# Patient Record
Sex: FEMALE | Race: ASIAN | NOT HISPANIC OR LATINO | Employment: FULL TIME | URBAN - METROPOLITAN AREA
[De-identification: names, ages, dates, MRNs, and addresses within clinical notes are randomized per-mention and may not be internally consistent; named-entity substitution may affect disease eponyms.]

---

## 2018-06-07 ENCOUNTER — OFFICE VISIT (OUTPATIENT)
Dept: INTERNAL MEDICINE | Facility: CLINIC | Age: 53
End: 2018-06-07
Payer: COMMERCIAL

## 2018-06-07 VITALS
BODY MASS INDEX: 16.23 KG/M2 | OXYGEN SATURATION: 100 % | DIASTOLIC BLOOD PRESSURE: 100 MMHG | SYSTOLIC BLOOD PRESSURE: 160 MMHG | TEMPERATURE: 98 F | WEIGHT: 115.94 LBS | HEART RATE: 95 BPM | HEIGHT: 71 IN

## 2018-06-07 DIAGNOSIS — Z82.3 FAMILY HISTORY OF CVA: ICD-10-CM

## 2018-06-07 DIAGNOSIS — R03.0 ELEVATED BP WITHOUT DIAGNOSIS OF HYPERTENSION: ICD-10-CM

## 2018-06-07 DIAGNOSIS — Z82.49 FAMILY HISTORY OF ESSENTIAL HYPERTENSION: ICD-10-CM

## 2018-06-07 DIAGNOSIS — Z76.89 ENCOUNTER TO ESTABLISH CARE WITH NEW DOCTOR: Primary | ICD-10-CM

## 2018-06-07 LAB
ALBUMIN SERPL BCP-MCNC: 4.1 G/DL
ALP SERPL-CCNC: 94 U/L
ALT SERPL W/O P-5'-P-CCNC: 9 U/L
ANION GAP SERPL CALC-SCNC: 10 MMOL/L
AST SERPL-CCNC: 18 U/L
BACTERIA #/AREA URNS AUTO: ABNORMAL /HPF
BASOPHILS # BLD AUTO: 0.05 K/UL
BASOPHILS NFR BLD: 0.5 %
BILIRUB SERPL-MCNC: 0.7 MG/DL
BILIRUB UR QL STRIP: NEGATIVE
BUN SERPL-MCNC: 9 MG/DL
CALCIUM SERPL-MCNC: 9.9 MG/DL
CHLORIDE SERPL-SCNC: 105 MMOL/L
CHOLEST SERPL-MCNC: 317 MG/DL
CHOLEST/HDLC SERPL: 4.8 {RATIO}
CLARITY UR REFRACT.AUTO: ABNORMAL
CO2 SERPL-SCNC: 24 MMOL/L
COLOR UR AUTO: YELLOW
CREAT SERPL-MCNC: 0.8 MG/DL
DIFFERENTIAL METHOD: ABNORMAL
EOSINOPHIL # BLD AUTO: 0.1 K/UL
EOSINOPHIL NFR BLD: 1.2 %
ERYTHROCYTE [DISTWIDTH] IN BLOOD BY AUTOMATED COUNT: 13 %
EST. GFR  (AFRICAN AMERICAN): >60 ML/MIN/1.73 M^2
EST. GFR  (NON AFRICAN AMERICAN): >60 ML/MIN/1.73 M^2
GLUCOSE SERPL-MCNC: 101 MG/DL
GLUCOSE UR QL STRIP: NEGATIVE
HCT VFR BLD AUTO: 44.6 %
HDLC SERPL-MCNC: 66 MG/DL
HDLC SERPL: 20.8 %
HGB BLD-MCNC: 14.9 G/DL
HGB UR QL STRIP: NEGATIVE
HYALINE CASTS UR QL AUTO: 1 /LPF
IMM GRANULOCYTES # BLD AUTO: 0.03 K/UL
IMM GRANULOCYTES NFR BLD AUTO: 0.3 %
KETONES UR QL STRIP: NEGATIVE
LDLC SERPL CALC-MCNC: 228.6 MG/DL
LEUKOCYTE ESTERASE UR QL STRIP: NEGATIVE
LYMPHOCYTES # BLD AUTO: 1.4 K/UL
LYMPHOCYTES NFR BLD: 15.2 %
MCH RBC QN AUTO: 29.2 PG
MCHC RBC AUTO-ENTMCNC: 33.4 G/DL
MCV RBC AUTO: 88 FL
MICROSCOPIC COMMENT: ABNORMAL
MONOCYTES # BLD AUTO: 0.6 K/UL
MONOCYTES NFR BLD: 6.3 %
NEUTROPHILS # BLD AUTO: 7.1 K/UL
NEUTROPHILS NFR BLD: 76.5 %
NITRITE UR QL STRIP: NEGATIVE
NONHDLC SERPL-MCNC: 251 MG/DL
NRBC BLD-RTO: 0 /100 WBC
PH UR STRIP: 7 [PH] (ref 5–8)
PLATELET # BLD AUTO: 302 K/UL
PMV BLD AUTO: 10.6 FL
POTASSIUM SERPL-SCNC: 4 MMOL/L
PROT SERPL-MCNC: 8.5 G/DL
PROT UR QL STRIP: ABNORMAL
RBC # BLD AUTO: 5.1 M/UL
RBC #/AREA URNS AUTO: 1 /HPF (ref 0–4)
SODIUM SERPL-SCNC: 139 MMOL/L
SP GR UR STRIP: 1.01 (ref 1–1.03)
SQUAMOUS #/AREA URNS AUTO: 1 /HPF
TRIGL SERPL-MCNC: 112 MG/DL
TSH SERPL DL<=0.005 MIU/L-ACNC: 1.81 UIU/ML
URN SPEC COLLECT METH UR: ABNORMAL
UROBILINOGEN UR STRIP-ACNC: NEGATIVE EU/DL
WBC # BLD AUTO: 9.27 K/UL
WBC #/AREA URNS AUTO: 0 /HPF (ref 0–5)

## 2018-06-07 PROCEDURE — 99999 PR PBB SHADOW E&M-NEW PATIENT-LVL IV: CPT | Mod: PBBFAC,,, | Performed by: FAMILY MEDICINE

## 2018-06-07 PROCEDURE — 99203 OFFICE O/P NEW LOW 30 MIN: CPT | Mod: S$GLB,,, | Performed by: FAMILY MEDICINE

## 2018-06-07 PROCEDURE — 3008F BODY MASS INDEX DOCD: CPT | Mod: CPTII,S$GLB,, | Performed by: FAMILY MEDICINE

## 2018-06-07 PROCEDURE — 93005 ELECTROCARDIOGRAM TRACING: CPT | Mod: S$GLB,,, | Performed by: FAMILY MEDICINE

## 2018-06-07 PROCEDURE — 81001 URINALYSIS AUTO W/SCOPE: CPT

## 2018-06-07 PROCEDURE — 85025 COMPLETE CBC W/AUTO DIFF WBC: CPT

## 2018-06-07 PROCEDURE — 84443 ASSAY THYROID STIM HORMONE: CPT

## 2018-06-07 PROCEDURE — 80053 COMPREHEN METABOLIC PANEL: CPT

## 2018-06-07 PROCEDURE — 80061 LIPID PANEL: CPT

## 2018-06-07 PROCEDURE — 93010 ELECTROCARDIOGRAM REPORT: CPT | Mod: S$GLB,,, | Performed by: INTERNAL MEDICINE

## 2018-06-07 NOTE — PROGRESS NOTES
Subjective:       Patient ID: Juan C Dennis is a 53 y.o. female.    Chief Complaint: Annual Exam and Establish Care    New patient establish care.  She is on no prescription medications.  Family history includes father with hypertension and CVA) hypertension.  She had preeclampsia 2002 with decreased vision left eye.  Decreased vision lasted several months and resolved.  She has paresthesias of 1/9/2016.  Emergency room evaluation was done at that time.  She had a CT scan lab tests that were all reported as negative.  She is concerned about elevated blood pressure.  She denies any chest pain palpitations shortness of breath or edema.  She is not up-to-date on GYN exam mammogram and wants to defer this.      Review of Systems   Constitutional: Negative for activity change, appetite change, fatigue and unexpected weight change.   HENT: Negative for congestion, dental problem, ear pain, hearing loss, sneezing, sore throat, tinnitus and trouble swallowing.    Eyes: Negative for pain, redness, itching and visual disturbance.   Respiratory: Negative for cough, chest tightness, shortness of breath and wheezing.    Cardiovascular: Negative for chest pain, palpitations and leg swelling.   Gastrointestinal: Negative for abdominal distention, abdominal pain, blood in stool, constipation, diarrhea, nausea and vomiting.   Genitourinary: Negative for difficulty urinating, dysuria, frequency, hematuria, urgency and vaginal bleeding.   Musculoskeletal: Negative for arthralgias, back pain, joint swelling, neck pain and neck stiffness.   Skin: Negative for rash and wound.   Neurological: Negative for dizziness, tremors, syncope, weakness, light-headedness, numbness and headaches.   Hematological: Negative for adenopathy. Does not bruise/bleed easily.   Psychiatric/Behavioral: Negative for agitation, dysphoric mood and sleep disturbance. The patient is not nervous/anxious.        Objective:      Physical Exam   Constitutional:  She is oriented to person, place, and time. She appears well-developed and well-nourished.   HENT:   Right Ear: External ear normal.   Left Ear: External ear normal.   Nose: Nose normal.   Mouth/Throat: Oropharynx is clear and moist.   Eyes: Conjunctivae and EOM are normal. Pupils are equal, round, and reactive to light.   Neck: Normal range of motion. Neck supple. No thyromegaly present.   Cardiovascular: Normal rate, regular rhythm and normal heart sounds.    No murmur heard.  Pulmonary/Chest: Effort normal and breath sounds normal. She has no wheezes. She has no rales.   Abdominal: Soft. Bowel sounds are normal. She exhibits no distension and no mass. There is no tenderness.   Musculoskeletal: She exhibits no edema or tenderness.   Lymphadenopathy:     She has no cervical adenopathy.   Neurological: She is alert and oriented to person, place, and time. She has normal reflexes. She displays normal reflexes. No cranial nerve deficit. She exhibits normal muscle tone. Coordination normal.   Skin: Skin is warm and dry. No rash noted.   Psychiatric: She has a normal mood and affect. Her behavior is normal. Judgment and thought content normal.       No results found for any previous visit.     Assessment:       1. Elevated BP without diagnosis of hypertension         blood pressure recheck 150/100.  Pulse is 95.  She reports purulent nervous today.  Monitor blood pressure home for 2 weeks and return in 2 weeks.  Consider medication on return.  EKG lab was ordered today.    Elevated BP without diagnosis of hypertension  -     CBC auto differential  -     Urinalysis  -     Comprehensive metabolic panel  -     Lipid panel  -     TSH  -     EKG 12-lead

## 2018-06-11 DIAGNOSIS — R77.9 ELEVATED SERUM PROTEIN LEVEL: Primary | ICD-10-CM

## 2018-06-21 ENCOUNTER — OFFICE VISIT (OUTPATIENT)
Dept: INTERNAL MEDICINE | Facility: CLINIC | Age: 53
End: 2018-06-21
Payer: COMMERCIAL

## 2018-06-21 VITALS
HEIGHT: 59 IN | HEART RATE: 87 BPM | DIASTOLIC BLOOD PRESSURE: 84 MMHG | BODY MASS INDEX: 23.11 KG/M2 | WEIGHT: 114.63 LBS | SYSTOLIC BLOOD PRESSURE: 120 MMHG | OXYGEN SATURATION: 98 % | TEMPERATURE: 97 F

## 2018-06-21 DIAGNOSIS — E78.5 HYPERLIPIDEMIA, UNSPECIFIED HYPERLIPIDEMIA TYPE: ICD-10-CM

## 2018-06-21 DIAGNOSIS — R03.0 ELEVATED BP WITHOUT DIAGNOSIS OF HYPERTENSION: Primary | ICD-10-CM

## 2018-06-21 PROCEDURE — 99213 OFFICE O/P EST LOW 20 MIN: CPT | Mod: S$GLB,,, | Performed by: FAMILY MEDICINE

## 2018-06-21 PROCEDURE — 3008F BODY MASS INDEX DOCD: CPT | Mod: CPTII,S$GLB,, | Performed by: FAMILY MEDICINE

## 2018-06-21 PROCEDURE — 99999 PR PBB SHADOW E&M-EST. PATIENT-LVL III: CPT | Mod: PBBFAC,,, | Performed by: FAMILY MEDICINE

## 2018-06-21 NOTE — PROGRESS NOTES
Subjective:       Patient ID: Juan C Dennis is a 53 y.o. female.    Chief Complaint: Follow-up    Follow-up elevated blood pressure review lab.  Home blood pressure readings 126/82, 129/70, 132/78, 149/75.  She denies headache chest pain palpitations shortness of breath or edema      Review of Systems   Constitutional: Negative for appetite change, fatigue and unexpected weight change.   Respiratory: Negative for cough, shortness of breath and wheezing.    Cardiovascular: Negative for chest pain, palpitations and leg swelling.   Gastrointestinal: Negative for abdominal pain.   Genitourinary: Negative for difficulty urinating.   Neurological: Negative for headaches.       Objective:      Physical Exam   Constitutional: She is oriented to person, place, and time. She appears well-developed and well-nourished. No distress.   Neck: Neck supple. No thyromegaly present.   Cardiovascular: Normal rate, regular rhythm and normal heart sounds.    No murmur heard.  Pulmonary/Chest: Effort normal and breath sounds normal. No respiratory distress. She has no wheezes.   Abdominal: Soft. Bowel sounds are normal. She exhibits no mass. There is no tenderness.   Lymphadenopathy:     She has no cervical adenopathy.   Neurological: She is alert and oriented to person, place, and time.       Office Visit on 06/07/2018   Component Date Value Ref Range Status    WBC 06/07/2018 9.27  3.90 - 12.70 K/uL Final    RBC 06/07/2018 5.10  4.00 - 5.40 M/uL Final    Hemoglobin 06/07/2018 14.9  12.0 - 16.0 g/dL Final    Hematocrit 06/07/2018 44.6  37.0 - 48.5 % Final    MCV 06/07/2018 88  82 - 98 fL Final    MCH 06/07/2018 29.2  27.0 - 31.0 pg Final    MCHC 06/07/2018 33.4  32.0 - 36.0 g/dL Final    RDW 06/07/2018 13.0  11.5 - 14.5 % Final    Platelets 06/07/2018 302  150 - 350 K/uL Final    MPV 06/07/2018 10.6  9.2 - 12.9 fL Final    Immature Granulocytes 06/07/2018 0.3  0.0 - 0.5 % Final    Gran # (ANC) 06/07/2018 7.1  1.8 - 7.7  K/uL Final    Immature Grans (Abs) 06/07/2018 0.03  0.00 - 0.04 K/uL Final    Lymph # 06/07/2018 1.4  1.0 - 4.8 K/uL Final    Mono # 06/07/2018 0.6  0.3 - 1.0 K/uL Final    Eos # 06/07/2018 0.1  0.0 - 0.5 K/uL Final    Baso # 06/07/2018 0.05  0.00 - 0.20 K/uL Final    nRBC 06/07/2018 0  0 /100 WBC Final    Gran% 06/07/2018 76.5* 38.0 - 73.0 % Final    Lymph% 06/07/2018 15.2* 18.0 - 48.0 % Final    Mono% 06/07/2018 6.3  4.0 - 15.0 % Final    Eosinophil% 06/07/2018 1.2  0.0 - 8.0 % Final    Basophil% 06/07/2018 0.5  0.0 - 1.9 % Final    Differential Method 06/07/2018 Automated   Final    Specimen UA 06/07/2018 Urine, Clean Catch   Final    Color, UA 06/07/2018 Yellow  Yellow, Straw, Flory Final    Appearance, UA 06/07/2018 Hazy* Clear Final    pH, UA 06/07/2018 7.0  5.0 - 8.0 Final    Specific Gravity, UA 06/07/2018 1.010  1.005 - 1.030 Final    Protein, UA 06/07/2018 2+* Negative Final    Glucose, UA 06/07/2018 Negative  Negative Final    Ketones, UA 06/07/2018 Negative  Negative Final    Bilirubin (UA) 06/07/2018 Negative  Negative Final    Occult Blood UA 06/07/2018 Negative  Negative Final    Nitrite, UA 06/07/2018 Negative  Negative Final    Urobilinogen, UA 06/07/2018 Negative  <2.0 EU/dL Final    Leukocytes, UA 06/07/2018 Negative  Negative Final    Sodium 06/07/2018 139  136 - 145 mmol/L Final    Potassium 06/07/2018 4.0  3.5 - 5.1 mmol/L Final    Chloride 06/07/2018 105  95 - 110 mmol/L Final    CO2 06/07/2018 24  23 - 29 mmol/L Final    Glucose 06/07/2018 101  70 - 110 mg/dL Final    BUN, Bld 06/07/2018 9  6 - 20 mg/dL Final    Creatinine 06/07/2018 0.8  0.5 - 1.4 mg/dL Final    Calcium 06/07/2018 9.9  8.7 - 10.5 mg/dL Final    Total Protein 06/07/2018 8.5* 6.0 - 8.4 g/dL Final    Albumin 06/07/2018 4.1  3.5 - 5.2 g/dL Final    Total Bilirubin 06/07/2018 0.7  0.1 - 1.0 mg/dL Final    Alkaline Phosphatase 06/07/2018 94  55 - 135 U/L Final    AST 06/07/2018 18  10 - 40 U/L  Final    ALT 06/07/2018 9* 10 - 44 U/L Final    Anion Gap 06/07/2018 10  8 - 16 mmol/L Final    eGFR if African American 06/07/2018 >60.0  >60 mL/min/1.73 m^2 Final    eGFR if non African American 06/07/2018 >60.0  >60 mL/min/1.73 m^2 Final    Cholesterol 06/07/2018 317* 120 - 199 mg/dL Final    Triglycerides 06/07/2018 112  30 - 150 mg/dL Final    HDL 06/07/2018 66  40 - 75 mg/dL Final    LDL Cholesterol 06/07/2018 228.6* 63.0 - 159.0 mg/dL Final    HDL/Chol Ratio 06/07/2018 20.8  20.0 - 50.0 % Final    Total Cholesterol/HDL Ratio 06/07/2018 4.8  2.0 - 5.0 Final    Non-HDL Cholesterol 06/07/2018 251  mg/dL Final    TSH 06/07/2018 1.808  0.400 - 4.000 uIU/mL Final    RBC, UA 06/07/2018 1  0 - 4 /hpf Final    WBC, UA 06/07/2018 0  0 - 5 /hpf Final    Bacteria, UA 06/07/2018 Few* None-Occ /hpf Final    Squam Epithel, UA 06/07/2018 1  /hpf Final    Hyaline Casts, UA 06/07/2018 1  0-1/lpf /lpf Final    Microscopic Comment 06/07/2018 SEE COMMENT   Final     Assessment:       No diagnosis found.    Plan:     Blood pressure 120/84.  Continue home monitor.  Lab was reviewed.  Low-cholesterol diet discussed.  Return in one month office visit to recheck lipid profile as well as protein electrophoresis.  We will also recheck urine protein.    There are no diagnoses linked to this encounter.

## 2018-07-09 ENCOUNTER — PATIENT OUTREACH (OUTPATIENT)
Dept: ADMINISTRATIVE | Facility: HOSPITAL | Age: 53
End: 2018-07-09

## 2018-07-23 ENCOUNTER — OFFICE VISIT (OUTPATIENT)
Dept: INTERNAL MEDICINE | Facility: CLINIC | Age: 53
End: 2018-07-23
Payer: COMMERCIAL

## 2018-07-23 VITALS
WEIGHT: 113.75 LBS | HEIGHT: 59 IN | DIASTOLIC BLOOD PRESSURE: 80 MMHG | BODY MASS INDEX: 22.93 KG/M2 | HEART RATE: 89 BPM | SYSTOLIC BLOOD PRESSURE: 137 MMHG | TEMPERATURE: 98 F | OXYGEN SATURATION: 98 %

## 2018-07-23 DIAGNOSIS — R80.9 PROTEINURIA, UNSPECIFIED TYPE: ICD-10-CM

## 2018-07-23 DIAGNOSIS — E78.5 HYPERLIPIDEMIA, UNSPECIFIED HYPERLIPIDEMIA TYPE: Primary | ICD-10-CM

## 2018-07-23 DIAGNOSIS — R77.9 ELEVATED SERUM PROTEIN LEVEL: ICD-10-CM

## 2018-07-23 LAB
BILIRUB UR QL STRIP: NEGATIVE
CLARITY UR REFRACT.AUTO: ABNORMAL
COLOR UR AUTO: YELLOW
GLUCOSE UR QL STRIP: NEGATIVE
HGB UR QL STRIP: NEGATIVE
HYALINE CASTS UR QL AUTO: 10 /LPF
KETONES UR QL STRIP: NEGATIVE
LEUKOCYTE ESTERASE UR QL STRIP: NEGATIVE
MICROSCOPIC COMMENT: ABNORMAL
NITRITE UR QL STRIP: NEGATIVE
PH UR STRIP: 7 [PH] (ref 5–8)
PROT UR QL STRIP: NEGATIVE
SP GR UR STRIP: 1.01 (ref 1–1.03)
SQUAMOUS #/AREA URNS AUTO: 1 /HPF
URN SPEC COLLECT METH UR: ABNORMAL
UROBILINOGEN UR STRIP-ACNC: NEGATIVE EU/DL
WBC #/AREA URNS AUTO: 0 /HPF (ref 0–5)

## 2018-07-23 PROCEDURE — 84155 ASSAY OF PROTEIN SERUM: CPT

## 2018-07-23 PROCEDURE — 99999 PR PBB SHADOW E&M-EST. PATIENT-LVL III: CPT | Mod: PBBFAC,,, | Performed by: FAMILY MEDICINE

## 2018-07-23 PROCEDURE — 3008F BODY MASS INDEX DOCD: CPT | Mod: CPTII,S$GLB,, | Performed by: FAMILY MEDICINE

## 2018-07-23 PROCEDURE — 80061 LIPID PANEL: CPT

## 2018-07-23 PROCEDURE — 99213 OFFICE O/P EST LOW 20 MIN: CPT | Mod: S$GLB,,, | Performed by: FAMILY MEDICINE

## 2018-07-23 PROCEDURE — 81001 URINALYSIS AUTO W/SCOPE: CPT

## 2018-07-23 NOTE — PROGRESS NOTES
Subjective:       Patient ID: Juan C Dennis is a 53 y.o. female.    Chief Complaint: Follow-up    Follow-up elevated blood pressure hyperlipidemia proteinuria.  She reports home blood pressure monitor has been normal.  She is started low cholesterol diet  reports 1 lb weight loss.  Health maintenance was reviewed.  She wants to for gyn exam mammogram colonoscopy.      Review of Systems   Constitutional: Negative for appetite change, fatigue and unexpected weight change.   Respiratory: Negative for cough, shortness of breath and wheezing.    Cardiovascular: Negative for chest pain, palpitations and leg swelling.   Gastrointestinal: Negative for abdominal pain.   Genitourinary: Negative for difficulty urinating.   Musculoskeletal: Negative for myalgias.   Neurological: Negative for headaches.       Objective:      Physical Exam   Constitutional: She is oriented to person, place, and time. She appears well-developed and well-nourished. No distress.   Neck: Neck supple. No thyromegaly present.   Cardiovascular: Normal rate, regular rhythm and normal heart sounds.    No murmur heard.  Pulmonary/Chest: Effort normal and breath sounds normal. No respiratory distress. She has no wheezes.   Lymphadenopathy:     She has no cervical adenopathy.   Neurological: She is alert and oriented to person, place, and time.       Office Visit on 06/07/2018   Component Date Value Ref Range Status    WBC 06/07/2018 9.27  3.90 - 12.70 K/uL Final    RBC 06/07/2018 5.10  4.00 - 5.40 M/uL Final    Hemoglobin 06/07/2018 14.9  12.0 - 16.0 g/dL Final    Hematocrit 06/07/2018 44.6  37.0 - 48.5 % Final    MCV 06/07/2018 88  82 - 98 fL Final    MCH 06/07/2018 29.2  27.0 - 31.0 pg Final    MCHC 06/07/2018 33.4  32.0 - 36.0 g/dL Final    RDW 06/07/2018 13.0  11.5 - 14.5 % Final    Platelets 06/07/2018 302  150 - 350 K/uL Final    MPV 06/07/2018 10.6  9.2 - 12.9 fL Final    Immature Granulocytes 06/07/2018 0.3  0.0 - 0.5 % Final     Gran # (ANC) 06/07/2018 7.1  1.8 - 7.7 K/uL Final    Immature Grans (Abs) 06/07/2018 0.03  0.00 - 0.04 K/uL Final    Lymph # 06/07/2018 1.4  1.0 - 4.8 K/uL Final    Mono # 06/07/2018 0.6  0.3 - 1.0 K/uL Final    Eos # 06/07/2018 0.1  0.0 - 0.5 K/uL Final    Baso # 06/07/2018 0.05  0.00 - 0.20 K/uL Final    nRBC 06/07/2018 0  0 /100 WBC Final    Gran% 06/07/2018 76.5* 38.0 - 73.0 % Final    Lymph% 06/07/2018 15.2* 18.0 - 48.0 % Final    Mono% 06/07/2018 6.3  4.0 - 15.0 % Final    Eosinophil% 06/07/2018 1.2  0.0 - 8.0 % Final    Basophil% 06/07/2018 0.5  0.0 - 1.9 % Final    Differential Method 06/07/2018 Automated   Final    Specimen UA 06/07/2018 Urine, Clean Catch   Final    Color, UA 06/07/2018 Yellow  Yellow, Straw, Flory Final    Appearance, UA 06/07/2018 Hazy* Clear Final    pH, UA 06/07/2018 7.0  5.0 - 8.0 Final    Specific Gravity, UA 06/07/2018 1.010  1.005 - 1.030 Final    Protein, UA 06/07/2018 2+* Negative Final    Glucose, UA 06/07/2018 Negative  Negative Final    Ketones, UA 06/07/2018 Negative  Negative Final    Bilirubin (UA) 06/07/2018 Negative  Negative Final    Occult Blood UA 06/07/2018 Negative  Negative Final    Nitrite, UA 06/07/2018 Negative  Negative Final    Urobilinogen, UA 06/07/2018 Negative  <2.0 EU/dL Final    Leukocytes, UA 06/07/2018 Negative  Negative Final    Sodium 06/07/2018 139  136 - 145 mmol/L Final    Potassium 06/07/2018 4.0  3.5 - 5.1 mmol/L Final    Chloride 06/07/2018 105  95 - 110 mmol/L Final    CO2 06/07/2018 24  23 - 29 mmol/L Final    Glucose 06/07/2018 101  70 - 110 mg/dL Final    BUN, Bld 06/07/2018 9  6 - 20 mg/dL Final    Creatinine 06/07/2018 0.8  0.5 - 1.4 mg/dL Final    Calcium 06/07/2018 9.9  8.7 - 10.5 mg/dL Final    Total Protein 06/07/2018 8.5* 6.0 - 8.4 g/dL Final    Albumin 06/07/2018 4.1  3.5 - 5.2 g/dL Final    Total Bilirubin 06/07/2018 0.7  0.1 - 1.0 mg/dL Final    Alkaline Phosphatase 06/07/2018 94  55 - 135 U/L  Final    AST 06/07/2018 18  10 - 40 U/L Final    ALT 06/07/2018 9* 10 - 44 U/L Final    Anion Gap 06/07/2018 10  8 - 16 mmol/L Final    eGFR if African American 06/07/2018 >60.0  >60 mL/min/1.73 m^2 Final    eGFR if non African American 06/07/2018 >60.0  >60 mL/min/1.73 m^2 Final    Cholesterol 06/07/2018 317* 120 - 199 mg/dL Final    Triglycerides 06/07/2018 112  30 - 150 mg/dL Final    HDL 06/07/2018 66  40 - 75 mg/dL Final    LDL Cholesterol 06/07/2018 228.6* 63.0 - 159.0 mg/dL Final    HDL/Chol Ratio 06/07/2018 20.8  20.0 - 50.0 % Final    Total Cholesterol/HDL Ratio 06/07/2018 4.8  2.0 - 5.0 Final    Non-HDL Cholesterol 06/07/2018 251  mg/dL Final    TSH 06/07/2018 1.808  0.400 - 4.000 uIU/mL Final    RBC, UA 06/07/2018 1  0 - 4 /hpf Final    WBC, UA 06/07/2018 0  0 - 5 /hpf Final    Bacteria, UA 06/07/2018 Few* None-Occ /hpf Final    Squam Epithel, UA 06/07/2018 1  /hpf Final    Hyaline Casts, UA 06/07/2018 1  0-1/lpf /lpf Final    Microscopic Comment 06/07/2018 SEE COMMENT   Final     Assessment:       1. Hyperlipidemia, unspecified hyperlipidemia type    2. Elevated serum protein level    3. Proteinuria, unspecified type        Plan:   Blood pressure is normal.  Continue to monitor.  Lab was ordered.      Hyperlipidemia, unspecified hyperlipidemia type  -     Lipid panel    Elevated serum protein level  -     Protein, total; Future; Expected date: 07/23/2018    Proteinuria, unspecified type  -     Urinalysis    Other orders  -     Cancel: Case request GI: COLONOSCOPY  -     Cancel: Mammo Digital Screening Bilateral With CAD; Future; Expected date: 07/09/2018

## 2018-07-24 LAB
CHOLEST SERPL-MCNC: 292 MG/DL
CHOLEST/HDLC SERPL: 4.6 {RATIO}
HDLC SERPL-MCNC: 64 MG/DL
HDLC SERPL: 21.9 %
LDLC SERPL CALC-MCNC: 206 MG/DL
NONHDLC SERPL-MCNC: 228 MG/DL
PROT SERPL-MCNC: 7.9 G/DL
TRIGL SERPL-MCNC: 110 MG/DL

## 2018-07-25 ENCOUNTER — TELEPHONE (OUTPATIENT)
Dept: INTERNAL MEDICINE | Facility: CLINIC | Age: 53
End: 2018-07-25

## 2018-10-03 ENCOUNTER — PATIENT OUTREACH (OUTPATIENT)
Dept: ADMINISTRATIVE | Facility: HOSPITAL | Age: 53
End: 2018-10-03

## 2019-01-29 ENCOUNTER — PATIENT OUTREACH (OUTPATIENT)
Dept: ADMINISTRATIVE | Facility: HOSPITAL | Age: 54
End: 2019-01-29

## 2019-05-07 DIAGNOSIS — Z12.11 COLON CANCER SCREENING: ICD-10-CM

## 2019-08-16 ENCOUNTER — PATIENT OUTREACH (OUTPATIENT)
Dept: ADMINISTRATIVE | Facility: HOSPITAL | Age: 54
End: 2019-08-16

## 2019-10-25 ENCOUNTER — PATIENT OUTREACH (OUTPATIENT)
Dept: ADMINISTRATIVE | Facility: HOSPITAL | Age: 54
End: 2019-10-25

## 2019-11-26 ENCOUNTER — OFFICE VISIT (OUTPATIENT)
Dept: INTERNAL MEDICINE | Facility: CLINIC | Age: 54
End: 2019-11-26
Payer: COMMERCIAL

## 2019-11-26 VITALS
SYSTOLIC BLOOD PRESSURE: 120 MMHG | OXYGEN SATURATION: 97 % | BODY MASS INDEX: 24.36 KG/M2 | DIASTOLIC BLOOD PRESSURE: 68 MMHG | TEMPERATURE: 97 F | HEIGHT: 59 IN | WEIGHT: 120.81 LBS | HEART RATE: 107 BPM

## 2019-11-26 DIAGNOSIS — Z00.00 ANNUAL PHYSICAL EXAM: Primary | ICD-10-CM

## 2019-11-26 DIAGNOSIS — Z28.39 IMMUNIZATION DEFICIENCY: ICD-10-CM

## 2019-11-26 DIAGNOSIS — Z82.49 FAMILY HISTORY OF ESSENTIAL HYPERTENSION: ICD-10-CM

## 2019-11-26 DIAGNOSIS — Z82.3 FAMILY HISTORY OF CVA: ICD-10-CM

## 2019-11-26 DIAGNOSIS — Z12.11 COLON CANCER SCREENING: ICD-10-CM

## 2019-11-26 DIAGNOSIS — E78.5 HYPERLIPIDEMIA, UNSPECIFIED HYPERLIPIDEMIA TYPE: ICD-10-CM

## 2019-11-26 PROBLEM — R03.0 ELEVATED BP WITHOUT DIAGNOSIS OF HYPERTENSION: Status: RESOLVED | Noted: 2018-06-07 | Resolved: 2019-11-26

## 2019-11-26 LAB
BASOPHILS # BLD AUTO: 0.07 K/UL (ref 0–0.2)
BASOPHILS NFR BLD: 1.1 % (ref 0–1.9)
BILIRUB UR QL STRIP: NEGATIVE
CLARITY UR REFRACT.AUTO: CLEAR
COLOR UR AUTO: NORMAL
DIFFERENTIAL METHOD: ABNORMAL
EOSINOPHIL # BLD AUTO: 0.2 K/UL (ref 0–0.5)
EOSINOPHIL NFR BLD: 3.6 % (ref 0–8)
ERYTHROCYTE [DISTWIDTH] IN BLOOD BY AUTOMATED COUNT: 12.8 % (ref 11.5–14.5)
GLUCOSE UR QL STRIP: NEGATIVE
HCT VFR BLD AUTO: 46.6 % (ref 37–48.5)
HGB BLD-MCNC: 14.7 G/DL (ref 12–16)
HGB UR QL STRIP: NEGATIVE
IMM GRANULOCYTES # BLD AUTO: 0.01 K/UL (ref 0–0.04)
IMM GRANULOCYTES NFR BLD AUTO: 0.2 % (ref 0–0.5)
KETONES UR QL STRIP: NEGATIVE
LEUKOCYTE ESTERASE UR QL STRIP: NEGATIVE
LYMPHOCYTES # BLD AUTO: 1.8 K/UL (ref 1–4.8)
LYMPHOCYTES NFR BLD: 28.4 % (ref 18–48)
MCH RBC QN AUTO: 27.9 PG (ref 27–31)
MCHC RBC AUTO-ENTMCNC: 31.5 G/DL (ref 32–36)
MCV RBC AUTO: 88 FL (ref 82–98)
MONOCYTES # BLD AUTO: 0.6 K/UL (ref 0.3–1)
MONOCYTES NFR BLD: 8.5 % (ref 4–15)
NEUTROPHILS # BLD AUTO: 3.8 K/UL (ref 1.8–7.7)
NEUTROPHILS NFR BLD: 58.2 % (ref 38–73)
NITRITE UR QL STRIP: NEGATIVE
NRBC BLD-RTO: 0 /100 WBC
PH UR STRIP: 8 [PH] (ref 5–8)
PLATELET # BLD AUTO: 294 K/UL (ref 150–350)
PMV BLD AUTO: 10.7 FL (ref 9.2–12.9)
PROT UR QL STRIP: NEGATIVE
RBC # BLD AUTO: 5.27 M/UL (ref 4–5.4)
SP GR UR STRIP: 1.01 (ref 1–1.03)
URN SPEC COLLECT METH UR: NORMAL
WBC # BLD AUTO: 6.47 K/UL (ref 3.9–12.7)

## 2019-11-26 PROCEDURE — 99999 PR PBB SHADOW E&M-EST. PATIENT-LVL III: ICD-10-PCS | Mod: PBBFAC,,, | Performed by: FAMILY MEDICINE

## 2019-11-26 PROCEDURE — 81003 URINALYSIS AUTO W/O SCOPE: CPT

## 2019-11-26 PROCEDURE — 99214 OFFICE O/P EST MOD 30 MIN: CPT | Mod: 25,S$GLB,, | Performed by: FAMILY MEDICINE

## 2019-11-26 PROCEDURE — 99999 PR PBB SHADOW E&M-EST. PATIENT-LVL III: CPT | Mod: PBBFAC,,, | Performed by: FAMILY MEDICINE

## 2019-11-26 PROCEDURE — 99214 PR OFFICE/OUTPT VISIT, EST, LEVL IV, 30-39 MIN: ICD-10-PCS | Mod: 25,S$GLB,, | Performed by: FAMILY MEDICINE

## 2019-11-26 PROCEDURE — 84443 ASSAY THYROID STIM HORMONE: CPT

## 2019-11-26 PROCEDURE — 80053 COMPREHEN METABOLIC PANEL: CPT

## 2019-11-26 PROCEDURE — 90715 TDAP VACCINE 7 YRS/> IM: CPT | Mod: S$GLB,,, | Performed by: FAMILY MEDICINE

## 2019-11-26 PROCEDURE — 3008F BODY MASS INDEX DOCD: CPT | Mod: CPTII,S$GLB,, | Performed by: FAMILY MEDICINE

## 2019-11-26 PROCEDURE — 3008F PR BODY MASS INDEX (BMI) DOCUMENTED: ICD-10-PCS | Mod: CPTII,S$GLB,, | Performed by: FAMILY MEDICINE

## 2019-11-26 PROCEDURE — 90471 IMMUNIZATION ADMIN: CPT | Mod: S$GLB,,, | Performed by: FAMILY MEDICINE

## 2019-11-26 PROCEDURE — 90471 TDAP VACCINE GREATER THAN OR EQUAL TO 7YO IM: ICD-10-PCS | Mod: S$GLB,,, | Performed by: FAMILY MEDICINE

## 2019-11-26 PROCEDURE — 80061 LIPID PANEL: CPT

## 2019-11-26 PROCEDURE — 90715 TDAP VACCINE GREATER THAN OR EQUAL TO 7YO IM: ICD-10-PCS | Mod: S$GLB,,, | Performed by: FAMILY MEDICINE

## 2019-11-26 PROCEDURE — 85025 COMPLETE CBC W/AUTO DIFF WBC: CPT

## 2019-11-26 NOTE — PROGRESS NOTES
Subjective:       Patient ID: Juan C Dennis is a 54 y.o. female.    Chief Complaint: Annual Exam    Annual exam.  Family history of CVA hypertension.  She has a history of elevated serum protein and hyperlipidemia.  She is no prescription medications.  Social history is negative for cigarettes and alcohol.    Review of Systems   Constitutional: Negative for activity change, appetite change, fatigue and unexpected weight change.   HENT: Negative for congestion, dental problem, ear pain, hearing loss, sneezing, sore throat, tinnitus and trouble swallowing.    Eyes: Negative for pain, redness, itching and visual disturbance.   Respiratory: Negative for cough, chest tightness, shortness of breath and wheezing.    Cardiovascular: Negative for chest pain, palpitations and leg swelling.   Gastrointestinal: Negative for abdominal distention, abdominal pain, blood in stool, constipation, diarrhea, nausea and vomiting.   Genitourinary: Negative for difficulty urinating, dysuria, frequency, hematuria and urgency.        She reports that she occasionally skips menstrual period   Musculoskeletal: Negative for arthralgias, back pain, joint swelling, neck pain and neck stiffness.   Skin: Negative for rash and wound.   Neurological: Negative for dizziness, tremors, syncope, weakness, light-headedness, numbness and headaches.   Hematological: Negative for adenopathy. Does not bruise/bleed easily.   Psychiatric/Behavioral: Negative for agitation, dysphoric mood and sleep disturbance. The patient is not nervous/anxious.        Objective:      Physical Exam   Constitutional: She is oriented to person, place, and time. She appears well-developed and well-nourished. No distress.   HENT:   Right Ear: External ear normal.   Left Ear: External ear normal.   Nose: Nose normal.   Mouth/Throat: Oropharynx is clear and moist.   Eyes: Pupils are equal, round, and reactive to light. Conjunctivae and EOM are normal.   Neck: Normal range of  motion. Neck supple. No JVD present. No tracheal deviation present. No thyromegaly present.   Cardiovascular: Normal rate, regular rhythm and normal heart sounds. Exam reveals no gallop.   No murmur heard.  Pulmonary/Chest: Effort normal and breath sounds normal. No respiratory distress. She has no wheezes. She has no rales.   Abdominal: Soft. Bowel sounds are normal. She exhibits no distension and no mass. There is no tenderness.   Musculoskeletal: She exhibits no edema or tenderness.   Lymphadenopathy:     She has no cervical adenopathy.   Neurological: She is alert and oriented to person, place, and time. She has normal reflexes. She displays normal reflexes. No cranial nerve deficit. She exhibits normal muscle tone. Coordination normal.   Skin: Skin is warm and dry. No rash noted. She is not diaphoretic.   Psychiatric: She has a normal mood and affect. Her behavior is normal. Judgment and thought content normal.       Office Visit on 07/23/2018   Component Date Value Ref Range Status    Cholesterol 07/23/2018 292* 120 - 199 mg/dL Final    Triglycerides 07/23/2018 110  30 - 150 mg/dL Final    HDL 07/23/2018 64  40 - 75 mg/dL Final    LDL Cholesterol 07/23/2018 206.0* 63.0 - 159.0 mg/dL Final    Hdl/Cholesterol Ratio 07/23/2018 21.9  20.0 - 50.0 % Final    Total Cholesterol/HDL Ratio 07/23/2018 4.6  2.0 - 5.0 Final    Non-HDL Cholesterol 07/23/2018 228  mg/dL Final    Specimen UA 07/23/2018 Urine, Clean Catch   Final    Color, UA 07/23/2018 Yellow  Yellow, Straw, Flory Final    Appearance, UA 07/23/2018 Cloudy* Clear Final    pH, UA 07/23/2018 7.0  5.0 - 8.0 Final    Specific Gravity, UA 07/23/2018 1.015  1.005 - 1.030 Final    Protein, UA 07/23/2018 Negative  Negative Final    Glucose, UA 07/23/2018 Negative  Negative Final    Ketones, UA 07/23/2018 Negative  Negative Final    Bilirubin (UA) 07/23/2018 Negative  Negative Final    Occult Blood UA 07/23/2018 Negative  Negative Final    Nitrite, UA  07/23/2018 Negative  Negative Final    Urobilinogen, UA 07/23/2018 Negative  <2.0 EU/dL Final    Leukocytes, UA 07/23/2018 Negative  Negative Final    Total Protein 07/23/2018 7.9  6.0 - 8.4 g/dL Final    WBC, UA 07/23/2018 0  0 - 5 /hpf Final    Squam Epithel, UA 07/23/2018 1  /hpf Final    Hyaline Casts, UA 07/23/2018 10* 0-1/lpf /lpf Final    Microscopic Comment 07/23/2018 SEE COMMENT   Final     Assessment:       1. Annual physical exam    2. Colon cancer screening        Plan:     Health maintenance reviewed.  CBC urinalysis CMP lipids TSH was ordered.  Fecal immunoassay test was ordered.  She declined colonoscopy.  She declines mammogram and Pap smear.  She declined flu immunization.  She agrees to tetanus immunization.  She will get shingles immunization at the pharmacy.  Routine follow-up in 1 year.    Annual physical exam  -     CBC auto differential  -     Urinalysis  -     Comprehensive metabolic panel  -     Lipid panel  -     TSH    Colon cancer screening  -     Fecal Immunochemical Test (iFOBT); Future; Expected date: 11/26/2019    Other orders  -     (In Office Administered) Tdap Vaccine

## 2019-11-27 LAB
ALBUMIN SERPL BCP-MCNC: 4.3 G/DL (ref 3.5–5.2)
ALP SERPL-CCNC: 139 U/L (ref 55–135)
ALT SERPL W/O P-5'-P-CCNC: 42 U/L (ref 10–44)
ANION GAP SERPL CALC-SCNC: 9 MMOL/L (ref 8–16)
AST SERPL-CCNC: 34 U/L (ref 10–40)
BILIRUB SERPL-MCNC: 0.5 MG/DL (ref 0.1–1)
BUN SERPL-MCNC: 11 MG/DL (ref 6–20)
CALCIUM SERPL-MCNC: 9.6 MG/DL (ref 8.7–10.5)
CHLORIDE SERPL-SCNC: 106 MMOL/L (ref 95–110)
CHOLEST SERPL-MCNC: 341 MG/DL (ref 120–199)
CHOLEST/HDLC SERPL: 4.4 {RATIO} (ref 2–5)
CO2 SERPL-SCNC: 27 MMOL/L (ref 23–29)
CREAT SERPL-MCNC: 0.8 MG/DL (ref 0.5–1.4)
EST. GFR  (AFRICAN AMERICAN): >60 ML/MIN/1.73 M^2
EST. GFR  (NON AFRICAN AMERICAN): >60 ML/MIN/1.73 M^2
GLUCOSE SERPL-MCNC: 100 MG/DL (ref 70–110)
HDLC SERPL-MCNC: 78 MG/DL (ref 40–75)
HDLC SERPL: 22.9 % (ref 20–50)
LDLC SERPL CALC-MCNC: 236.2 MG/DL (ref 63–159)
NONHDLC SERPL-MCNC: 263 MG/DL
POTASSIUM SERPL-SCNC: 4 MMOL/L (ref 3.5–5.1)
PROT SERPL-MCNC: 8.5 G/DL (ref 6–8.4)
SODIUM SERPL-SCNC: 142 MMOL/L (ref 136–145)
TRIGL SERPL-MCNC: 134 MG/DL (ref 30–150)
TSH SERPL DL<=0.005 MIU/L-ACNC: 2.39 UIU/ML (ref 0.4–4)

## 2019-12-02 ENCOUNTER — TELEPHONE (OUTPATIENT)
Dept: INTERNAL MEDICINE | Facility: CLINIC | Age: 54
End: 2019-12-02

## 2019-12-02 NOTE — TELEPHONE ENCOUNTER
Protein level slightly elevated as before.  Cholesterol is high at 341 she needs to start on cholesterol lowering medication.  If she agrees I will send prescription pharmacy

## 2020-11-25 ENCOUNTER — HOSPITAL ENCOUNTER (OUTPATIENT)
Dept: CARDIOLOGY | Facility: HOSPITAL | Age: 55
Discharge: HOME | End: 2020-11-25
Payer: COMMERCIAL

## 2020-11-25 ENCOUNTER — OFFICE VISIT (OUTPATIENT)
Dept: INTERNAL MEDICINE | Facility: CLINIC | Age: 55
End: 2020-11-25
Payer: COMMERCIAL

## 2020-11-25 VITALS
HEART RATE: 92 BPM | BODY MASS INDEX: 24.18 KG/M2 | OXYGEN SATURATION: 99 % | DIASTOLIC BLOOD PRESSURE: 120 MMHG | SYSTOLIC BLOOD PRESSURE: 192 MMHG | TEMPERATURE: 98 F | WEIGHT: 119.94 LBS | HEIGHT: 59 IN

## 2020-11-25 DIAGNOSIS — Z00.00 ANNUAL PHYSICAL EXAM: Primary | ICD-10-CM

## 2020-11-25 DIAGNOSIS — R03.0 ELEVATED BLOOD PRESSURE READING: ICD-10-CM

## 2020-11-25 DIAGNOSIS — R80.9 PROTEINURIA, UNSPECIFIED TYPE: ICD-10-CM

## 2020-11-25 DIAGNOSIS — I10 ESSENTIAL HYPERTENSION: ICD-10-CM

## 2020-11-25 DIAGNOSIS — E78.5 HYPERLIPIDEMIA, UNSPECIFIED HYPERLIPIDEMIA TYPE: ICD-10-CM

## 2020-11-25 DIAGNOSIS — Z82.49 FAMILY HISTORY OF ESSENTIAL HYPERTENSION: ICD-10-CM

## 2020-11-25 DIAGNOSIS — Z82.3 FAMILY HISTORY OF CVA: ICD-10-CM

## 2020-11-25 PROCEDURE — 1126F PR PAIN SEVERITY QUANTIFIED, NO PAIN PRESENT: ICD-10-PCS | Mod: S$GLB,,, | Performed by: FAMILY MEDICINE

## 2020-11-25 PROCEDURE — 93010 EKG 12-LEAD: ICD-10-PCS | Mod: S$GLB,,, | Performed by: INTERNAL MEDICINE

## 2020-11-25 PROCEDURE — 99999 PR PBB SHADOW E&M-EST. PATIENT-LVL III: ICD-10-PCS | Mod: PBBFAC,,, | Performed by: FAMILY MEDICINE

## 2020-11-25 PROCEDURE — 99214 OFFICE O/P EST MOD 30 MIN: CPT | Mod: S$GLB,,, | Performed by: FAMILY MEDICINE

## 2020-11-25 PROCEDURE — 3008F PR BODY MASS INDEX (BMI) DOCUMENTED: ICD-10-PCS | Mod: CPTII,S$GLB,, | Performed by: FAMILY MEDICINE

## 2020-11-25 PROCEDURE — 3008F BODY MASS INDEX DOCD: CPT | Mod: CPTII,S$GLB,, | Performed by: FAMILY MEDICINE

## 2020-11-25 PROCEDURE — 1126F AMNT PAIN NOTED NONE PRSNT: CPT | Mod: S$GLB,,, | Performed by: FAMILY MEDICINE

## 2020-11-25 PROCEDURE — 93005 ELECTROCARDIOGRAM TRACING: CPT

## 2020-11-25 PROCEDURE — 99999 PR PBB SHADOW E&M-EST. PATIENT-LVL III: CPT | Mod: PBBFAC,,, | Performed by: FAMILY MEDICINE

## 2020-11-25 PROCEDURE — 99214 PR OFFICE/OUTPT VISIT, EST, LEVL IV, 30-39 MIN: ICD-10-PCS | Mod: S$GLB,,, | Performed by: FAMILY MEDICINE

## 2020-11-25 PROCEDURE — 93010 ELECTROCARDIOGRAM REPORT: CPT | Mod: S$GLB,,, | Performed by: INTERNAL MEDICINE

## 2020-11-25 RX ORDER — HYDROCHLOROTHIAZIDE 25 MG/1
25 TABLET ORAL DAILY
Qty: 30 TABLET | Refills: 5 | Status: SHIPPED | OUTPATIENT
Start: 2020-11-25 | End: 2020-12-09

## 2020-11-25 NOTE — PROGRESS NOTES
Subjective:       Patient ID: Juan C Dennis is a 55 y.o. female.    Chief Complaint: Annual Exam    Annual exam.  She is currently on no medications.  She has a family history of hypertension and CVA.  She has a past history of elevated serum protein hyperlipidemia proteinuria.  She reports blood pressure at home is recently been elevated at 180/100.  She denies any headache chest pain palpitations shortness of breath or edema    Review of Systems   Constitutional: Negative for activity change, appetite change, fatigue and unexpected weight change.   HENT: Negative for congestion, dental problem, ear pain, hearing loss, sneezing, sore throat, tinnitus and trouble swallowing.    Eyes: Negative for pain, discharge, redness, itching and visual disturbance.   Respiratory: Negative for cough, chest tightness, shortness of breath and wheezing.    Cardiovascular: Negative for chest pain, palpitations and leg swelling.   Gastrointestinal: Negative for abdominal distention, abdominal pain, blood in stool, constipation, diarrhea, nausea and vomiting.   Genitourinary: Negative for difficulty urinating, dysuria, frequency, hematuria and urgency.        Previously she was having a menstrual period about a for 3 months in last menstrual period was January of this year.   Musculoskeletal: Negative for arthralgias, back pain, joint swelling, neck pain and neck stiffness.   Skin: Negative for rash and wound.   Neurological: Negative for dizziness, tremors, syncope, weakness, light-headedness, numbness and headaches.   Hematological: Negative for adenopathy. Does not bruise/bleed easily.   Psychiatric/Behavioral: Negative for agitation, dysphoric mood and sleep disturbance. The patient is not nervous/anxious.        Objective:      Physical Exam  Constitutional:       General: She is not in acute distress.     Appearance: Normal appearance. She is well-developed. She is not ill-appearing or diaphoretic.   HENT:      Right Ear:  Tympanic membrane and external ear normal.      Left Ear: Tympanic membrane and external ear normal.      Nose: Nose normal.      Mouth/Throat:      Mouth: Mucous membranes are moist.      Pharynx: No oropharyngeal exudate or posterior oropharyngeal erythema.   Eyes:      General:         Right eye: No discharge.         Left eye: No discharge.      Conjunctiva/sclera: Conjunctivae normal.      Pupils: Pupils are equal, round, and reactive to light.   Neck:      Musculoskeletal: Normal range of motion and neck supple. No muscular tenderness.      Thyroid: No thyromegaly.      Comments: Normal thyroid exam 2+ carotid pulses  Cardiovascular:      Rate and Rhythm: Normal rate and regular rhythm.      Heart sounds: Normal heart sounds. No murmur. No friction rub.   Pulmonary:      Effort: Pulmonary effort is normal.      Breath sounds: Normal breath sounds. No wheezing, rhonchi or rales.   Abdominal:      General: Bowel sounds are normal. There is no distension.      Palpations: Abdomen is soft. There is no mass.      Tenderness: There is no abdominal tenderness.      Hernia: No hernia is present.   Genitourinary:     Comments: She declined mammogram declined routine gyn exam and Pap  Musculoskeletal:         General: No tenderness or deformity.   Lymphadenopathy:      Cervical: No cervical adenopathy.   Skin:     General: Skin is warm and dry.      Coloration: Skin is not pale.      Findings: No erythema or rash.   Neurological:      Mental Status: She is alert and oriented to person, place, and time.      Motor: No weakness or abnormal muscle tone.      Coordination: Coordination normal.      Deep Tendon Reflexes: Reflexes are normal and symmetric.   Psychiatric:         Mood and Affect: Mood normal.         Behavior: Behavior normal.         Thought Content: Thought content normal.         Judgment: Judgment normal.         Office Visit on 11/26/2019   Component Date Value Ref Range Status    WBC 11/26/2019 6.47   3.90 - 12.70 K/uL Final    RBC 11/26/2019 5.27  4.00 - 5.40 M/uL Final    Hemoglobin 11/26/2019 14.7  12.0 - 16.0 g/dL Final    Hematocrit 11/26/2019 46.6  37.0 - 48.5 % Final    MCV 11/26/2019 88  82 - 98 fL Final    MCH 11/26/2019 27.9  27.0 - 31.0 pg Final    MCHC 11/26/2019 31.5* 32.0 - 36.0 g/dL Final    RDW 11/26/2019 12.8  11.5 - 14.5 % Final    Platelets 11/26/2019 294  150 - 350 K/uL Final    MPV 11/26/2019 10.7  9.2 - 12.9 fL Final    Immature Granulocytes 11/26/2019 0.2  0.0 - 0.5 % Final    Gran # (ANC) 11/26/2019 3.8  1.8 - 7.7 K/uL Final    Immature Grans (Abs) 11/26/2019 0.01  0.00 - 0.04 K/uL Final    Lymph # 11/26/2019 1.8  1.0 - 4.8 K/uL Final    Mono # 11/26/2019 0.6  0.3 - 1.0 K/uL Final    Eos # 11/26/2019 0.2  0.0 - 0.5 K/uL Final    Baso # 11/26/2019 0.07  0.00 - 0.20 K/uL Final    nRBC 11/26/2019 0  0 /100 WBC Final    Gran % 11/26/2019 58.2  38.0 - 73.0 % Final    Lymph % 11/26/2019 28.4  18.0 - 48.0 % Final    Mono % 11/26/2019 8.5  4.0 - 15.0 % Final    Eosinophil % 11/26/2019 3.6  0.0 - 8.0 % Final    Basophil % 11/26/2019 1.1  0.0 - 1.9 % Final    Differential Method 11/26/2019 Automated   Final    Specimen UA 11/26/2019 Urine, Clean Catch   Final    Color, UA 11/26/2019 Straw  Yellow, Straw, Flory Final    Appearance, UA 11/26/2019 Clear  Clear Final    pH, UA 11/26/2019 8.0  5.0 - 8.0 Final    Specific [place], UA 11/26/2019 1.010  1.005 - 1.030 Final    Protein, UA 11/26/2019 Negative  Negative Final    Glucose, UA 11/26/2019 Negative  Negative Final    Ketones, UA 11/26/2019 Negative  Negative Final    Bilirubin (UA) 11/26/2019 Negative  Negative Final    Occult Blood UA 11/26/2019 Negative  Negative Final    Nitrite, UA 11/26/2019 Negative  Negative Final    Leukocytes, UA 11/26/2019 Negative  Negative Final    Sodium 11/26/2019 142  136 - 145 mmol/L Final    Potassium 11/26/2019 4.0  3.5 - 5.1 mmol/L Final    Chloride 11/26/2019 106  95 - 110  mmol/L Final    CO2 11/26/2019 27  23 - 29 mmol/L Final    Glucose 11/26/2019 100  70 - 110 mg/dL Final    BUN 11/26/2019 11  6 - 20 mg/dL Final    Creatinine 11/26/2019 0.8  0.5 - 1.4 mg/dL Final    Calcium 11/26/2019 9.6  8.7 - 10.5 mg/dL Final    Total Protein 11/26/2019 8.5* 6.0 - 8.4 g/dL Final    Albumin 11/26/2019 4.3  3.5 - 5.2 g/dL Final    Total Bilirubin 11/26/2019 0.5  0.1 - 1.0 mg/dL Final    Alkaline Phosphatase 11/26/2019 139* 55 - 135 U/L Final    AST 11/26/2019 34  10 - 40 U/L Final    ALT 11/26/2019 42  10 - 44 U/L Final    Anion Gap 11/26/2019 9  8 - 16 mmol/L Final    eGFR if African American 11/26/2019 >60.0  >60 mL/min/1.73 m^2 Final    eGFR if non African American 11/26/2019 >60.0  >60 mL/min/1.73 m^2 Final    Cholesterol 11/26/2019 341* 120 - 199 mg/dL Final    Triglycerides 11/26/2019 134  30 - 150 mg/dL Final    HDL 11/26/2019 78* 40 - 75 mg/dL Final    LDL Cholesterol 11/26/2019 236.2* 63.0 - 159.0 mg/dL Final    HDL/Cholesterol Ratio 11/26/2019 22.9  20.0 - 50.0 % Final    Total Cholesterol/HDL Ratio 11/26/2019 4.4  2.0 - 5.0 Final    Non-HDL Cholesterol 11/26/2019 263  mg/dL Final    TSH 11/26/2019 2.393  0.400 - 4.000 uIU/mL Final     Assessment:       1. Essential hypertension        Plan:     Blood pressure is elevated.  She wants to minimize medications.  Start HCTZ 25 mg a day.  Monitor blood pressure daily at home.  Follow-up in 2 weeks.  Lab was ordered.  She declined immunizations.  EKG ordered.    Essential hypertension  -     CBC Auto Differential; Future; Expected date: 11/25/2020  -     Urinalysis; Future; Expected date: 11/25/2020  -     Comprehensive Metabolic Panel; Future; Expected date: 11/25/2020  -     Lipid Panel; Future; Expected date: 11/25/2020  -     TSH; Future; Expected date: 11/25/2020  -     EKG 12-lead    Other orders  -     hydroCHLOROthiazide (HYDRODIURIL) 25 MG tablet; Take 1 tablet (25 mg total) by mouth once daily.  Dispense: 30  tablet; Refill: 5

## 2020-12-09 ENCOUNTER — OFFICE VISIT (OUTPATIENT)
Dept: INTERNAL MEDICINE | Facility: CLINIC | Age: 55
End: 2020-12-09
Payer: COMMERCIAL

## 2020-12-09 VITALS
HEIGHT: 59 IN | WEIGHT: 119.69 LBS | DIASTOLIC BLOOD PRESSURE: 94 MMHG | SYSTOLIC BLOOD PRESSURE: 150 MMHG | HEART RATE: 98 BPM | TEMPERATURE: 97 F | OXYGEN SATURATION: 98 % | BODY MASS INDEX: 24.13 KG/M2

## 2020-12-09 DIAGNOSIS — E78.5 HYPERLIPIDEMIA, UNSPECIFIED HYPERLIPIDEMIA TYPE: Primary | ICD-10-CM

## 2020-12-09 DIAGNOSIS — I10 ESSENTIAL HYPERTENSION: ICD-10-CM

## 2020-12-09 DIAGNOSIS — R03.0 ELEVATED BLOOD PRESSURE READING: ICD-10-CM

## 2020-12-09 DIAGNOSIS — Z28.39 IMMUNIZATION DEFICIENCY: ICD-10-CM

## 2020-12-09 DIAGNOSIS — E87.6 HYPOKALEMIA: ICD-10-CM

## 2020-12-09 PROCEDURE — 90686 FLU VACCINE (QUAD) GREATER THAN OR EQUAL TO 3YO PRESERVATIVE FREE IM: ICD-10-PCS | Mod: S$GLB,,, | Performed by: FAMILY MEDICINE

## 2020-12-09 PROCEDURE — 90471 FLU VACCINE (QUAD) GREATER THAN OR EQUAL TO 3YO PRESERVATIVE FREE IM: ICD-10-PCS | Mod: S$GLB,,, | Performed by: FAMILY MEDICINE

## 2020-12-09 PROCEDURE — 3008F PR BODY MASS INDEX (BMI) DOCUMENTED: ICD-10-PCS | Mod: CPTII,S$GLB,, | Performed by: FAMILY MEDICINE

## 2020-12-09 PROCEDURE — 1126F AMNT PAIN NOTED NONE PRSNT: CPT | Mod: S$GLB,,, | Performed by: FAMILY MEDICINE

## 2020-12-09 PROCEDURE — 90686 IIV4 VACC NO PRSV 0.5 ML IM: CPT | Mod: S$GLB,,, | Performed by: FAMILY MEDICINE

## 2020-12-09 PROCEDURE — 99999 PR PBB SHADOW E&M-EST. PATIENT-LVL III: ICD-10-PCS | Mod: PBBFAC,,, | Performed by: FAMILY MEDICINE

## 2020-12-09 PROCEDURE — 99999 PR PBB SHADOW E&M-EST. PATIENT-LVL III: CPT | Mod: PBBFAC,,, | Performed by: FAMILY MEDICINE

## 2020-12-09 PROCEDURE — 3008F BODY MASS INDEX DOCD: CPT | Mod: CPTII,S$GLB,, | Performed by: FAMILY MEDICINE

## 2020-12-09 PROCEDURE — 1126F PR PAIN SEVERITY QUANTIFIED, NO PAIN PRESENT: ICD-10-PCS | Mod: S$GLB,,, | Performed by: FAMILY MEDICINE

## 2020-12-09 PROCEDURE — 99213 PR OFFICE/OUTPT VISIT, EST, LEVL III, 20-29 MIN: ICD-10-PCS | Mod: 25,S$GLB,, | Performed by: FAMILY MEDICINE

## 2020-12-09 PROCEDURE — 90471 IMMUNIZATION ADMIN: CPT | Mod: S$GLB,,, | Performed by: FAMILY MEDICINE

## 2020-12-09 PROCEDURE — 99213 OFFICE O/P EST LOW 20 MIN: CPT | Mod: 25,S$GLB,, | Performed by: FAMILY MEDICINE

## 2020-12-09 RX ORDER — ATORVASTATIN CALCIUM 10 MG/1
10 TABLET, FILM COATED ORAL DAILY
Qty: 90 TABLET | Refills: 3 | Status: SHIPPED | OUTPATIENT
Start: 2020-12-09 | End: 2021-01-11 | Stop reason: SDUPTHER

## 2020-12-09 RX ORDER — HYDROCHLOROTHIAZIDE 25 MG/1
50 TABLET ORAL DAILY
Qty: 180 TABLET | Refills: 3 | Status: SHIPPED | OUTPATIENT
Start: 2020-12-09 | End: 2021-01-11

## 2020-12-09 RX ORDER — POTASSIUM CHLORIDE 750 MG/1
10 CAPSULE, EXTENDED RELEASE ORAL DAILY
Qty: 30 CAPSULE | Refills: 5 | Status: SHIPPED | OUTPATIENT
Start: 2020-12-09 | End: 2021-01-11

## 2020-12-09 NOTE — PROGRESS NOTES
Subjective:       Patient ID: Juan C Dennis is a 55 y.o. female.    Chief Complaint: Follow-up    Follow-up hypertension hyperlipidemia hypokalemia.  Blood pressure last visit 192/90.  Pressure today 150/94.  She is on HCTZ 25 mg a day.    Review of Systems   Constitutional: Negative for appetite change, fatigue and unexpected weight change.   Respiratory: Negative for cough, shortness of breath and wheezing.    Cardiovascular: Negative for chest pain, palpitations and leg swelling.   Gastrointestinal: Negative for abdominal pain.   Genitourinary: Negative for difficulty urinating.   Neurological: Negative for headaches.       Objective:      Physical Exam  Constitutional:       General: She is not in acute distress.     Appearance: She is well-developed.   Neck:      Musculoskeletal: Neck supple.      Thyroid: No thyromegaly.   Cardiovascular:      Rate and Rhythm: Normal rate and regular rhythm.      Heart sounds: Normal heart sounds. No murmur.   Pulmonary:      Effort: Pulmonary effort is normal. No respiratory distress.      Breath sounds: Normal breath sounds. No wheezing.   Abdominal:      General: Bowel sounds are normal.      Palpations: Abdomen is soft. There is no mass.      Tenderness: There is no abdominal tenderness.   Lymphadenopathy:      Cervical: No cervical adenopathy.   Neurological:      Mental Status: She is alert and oriented to person, place, and time.         Lab Visit on 11/25/2020   Component Date Value Ref Range Status    Specimen UA 11/25/2020 Urine, Clean Catch   Final    Color, UA 11/25/2020 Straw  Yellow, Straw, Flory Final    Appearance, UA 11/25/2020 Clear  Clear Final    pH, UA 11/25/2020 6.0  5.0 - 8.0 Final    Specific [place], UA 11/25/2020 1.010  1.005 - 1.030 Final    Protein, UA 11/25/2020 1+* Negative Final    Glucose, UA 11/25/2020 Negative  Negative Final    Ketones, UA 11/25/2020 Negative  Negative Final    Bilirubin (UA) 11/25/2020 Negative  Negative Final     Occult Blood UA 11/25/2020 Negative  Negative Final    Nitrite, UA 11/25/2020 Negative  Negative Final    Leukocytes, UA 11/25/2020 Negative  Negative Final    RBC, UA 11/25/2020 0  0 - 4 /hpf Final    WBC, UA 11/25/2020 0  0 - 5 /hpf Final    Bacteria 11/25/2020 None  None-Occ /hpf Final    Squam Epithel, UA 11/25/2020 4  /hpf Final    Hyaline Casts, UA 11/25/2020 0  0-1/lpf /lpf Final    Microscopic Comment 11/25/2020 SEE COMMENT   Final     Assessment:       1. Hyperlipidemia, unspecified hyperlipidemia type    2. Hypokalemia        Plan:   Increase hctz 50 mg a day.  Low-cholesterol diet discussed.  Start Lipitor 10 mg a day.  Micro-K 10 was ordered.  Follow-up in 1 month.  Continue blood pressure monitor.  Needs lipid hepatic profile potassium on return.  Flu vaccination given today.  Discussed need for Shingrix that she wants to defer      Hyperlipidemia, unspecified hyperlipidemia type  -     Hepatic Function Panel; Future; Expected date: 01/09/2021  -     Lipid Panel; Future; Expected date: 01/09/2021    Hypokalemia  -     Potassium; Future; Expected date: 01/09/2021    Other orders  -     atorvastatin (LIPITOR) 10 MG tablet; Take 1 tablet (10 mg total) by mouth once daily.  Dispense: 90 tablet; Refill: 3  -     hydroCHLOROthiazide (HYDRODIURIL) 25 MG tablet; Take 2 tablets (50 mg total) by mouth once daily.  Dispense: 180 tablet; Refill: 3  -     potassium chloride (MICRO-K) 10 MEQ CpSR; Take 1 capsule (10 mEq total) by mouth once daily.  Dispense: 30 capsule; Refill: 5  -     Influenza - Quadrivalent (PF)

## 2020-12-16 ENCOUNTER — PATIENT OUTREACH (OUTPATIENT)
Dept: ADMINISTRATIVE | Facility: HOSPITAL | Age: 55
End: 2020-12-16

## 2020-12-16 NOTE — PROGRESS NOTES
HTN Report. Attempting to contact pt to obtain a home BP reading or schedule nurse visit to recheck BP. Patient does have an appointment scheduled with her PCP for a one month follow up on 01/11/2021. Unable to reach patient at this time. Left voicemail.

## 2021-01-06 DIAGNOSIS — Z12.31 OTHER SCREENING MAMMOGRAM: ICD-10-CM

## 2021-01-08 ENCOUNTER — LAB VISIT (OUTPATIENT)
Dept: LAB | Facility: HOSPITAL | Age: 56
End: 2021-01-08
Attending: FAMILY MEDICINE
Payer: COMMERCIAL

## 2021-01-08 DIAGNOSIS — E78.5 HYPERLIPIDEMIA, UNSPECIFIED HYPERLIPIDEMIA TYPE: ICD-10-CM

## 2021-01-08 DIAGNOSIS — E87.6 HYPOKALEMIA: ICD-10-CM

## 2021-01-08 LAB
ALBUMIN SERPL BCP-MCNC: 4.2 G/DL (ref 3.5–5.2)
ALP SERPL-CCNC: 116 U/L (ref 55–135)
ALT SERPL W/O P-5'-P-CCNC: 14 U/L (ref 10–44)
AST SERPL-CCNC: 22 U/L (ref 10–40)
BILIRUB DIRECT SERPL-MCNC: 0.3 MG/DL (ref 0.1–0.3)
BILIRUB SERPL-MCNC: 0.6 MG/DL (ref 0.1–1)
CHOLEST SERPL-MCNC: 208 MG/DL (ref 120–199)
CHOLEST/HDLC SERPL: 2.8 {RATIO} (ref 2–5)
HDLC SERPL-MCNC: 73 MG/DL (ref 40–75)
HDLC SERPL: 35.1 % (ref 20–50)
LDLC SERPL CALC-MCNC: 118.6 MG/DL (ref 63–159)
NONHDLC SERPL-MCNC: 135 MG/DL
POTASSIUM SERPL-SCNC: 2.8 MMOL/L (ref 3.5–5.1)
PROT SERPL-MCNC: 8.4 G/DL (ref 6–8.4)
TRIGL SERPL-MCNC: 82 MG/DL (ref 30–150)

## 2021-01-08 PROCEDURE — 80061 LIPID PANEL: CPT

## 2021-01-08 PROCEDURE — 84132 ASSAY OF SERUM POTASSIUM: CPT

## 2021-01-08 PROCEDURE — 36415 COLL VENOUS BLD VENIPUNCTURE: CPT

## 2021-01-08 PROCEDURE — 80076 HEPATIC FUNCTION PANEL: CPT

## 2021-01-11 ENCOUNTER — OFFICE VISIT (OUTPATIENT)
Dept: INTERNAL MEDICINE | Facility: CLINIC | Age: 56
End: 2021-01-11
Payer: COMMERCIAL

## 2021-01-11 VITALS
OXYGEN SATURATION: 98 % | HEIGHT: 59 IN | SYSTOLIC BLOOD PRESSURE: 134 MMHG | WEIGHT: 121.94 LBS | HEART RATE: 92 BPM | TEMPERATURE: 99 F | DIASTOLIC BLOOD PRESSURE: 86 MMHG | BODY MASS INDEX: 24.58 KG/M2

## 2021-01-11 DIAGNOSIS — E87.6 HYPOKALEMIA: ICD-10-CM

## 2021-01-11 DIAGNOSIS — E78.5 HYPERLIPIDEMIA, UNSPECIFIED HYPERLIPIDEMIA TYPE: ICD-10-CM

## 2021-01-11 DIAGNOSIS — I10 ESSENTIAL HYPERTENSION: Primary | ICD-10-CM

## 2021-01-11 DIAGNOSIS — R03.0 ELEVATED BLOOD PRESSURE READING: ICD-10-CM

## 2021-01-11 DIAGNOSIS — Z12.11 COLON CANCER SCREENING: ICD-10-CM

## 2021-01-11 PROCEDURE — 3079F PR MOST RECENT DIASTOLIC BLOOD PRESSURE 80-89 MM HG: ICD-10-PCS | Mod: CPTII,S$GLB,, | Performed by: FAMILY MEDICINE

## 2021-01-11 PROCEDURE — 1126F PR PAIN SEVERITY QUANTIFIED, NO PAIN PRESENT: ICD-10-PCS | Mod: S$GLB,,, | Performed by: FAMILY MEDICINE

## 2021-01-11 PROCEDURE — 99214 PR OFFICE/OUTPT VISIT, EST, LEVL IV, 30-39 MIN: ICD-10-PCS | Mod: S$GLB,,, | Performed by: FAMILY MEDICINE

## 2021-01-11 PROCEDURE — 3079F DIAST BP 80-89 MM HG: CPT | Mod: CPTII,S$GLB,, | Performed by: FAMILY MEDICINE

## 2021-01-11 PROCEDURE — 3008F BODY MASS INDEX DOCD: CPT | Mod: CPTII,S$GLB,, | Performed by: FAMILY MEDICINE

## 2021-01-11 PROCEDURE — 3008F PR BODY MASS INDEX (BMI) DOCUMENTED: ICD-10-PCS | Mod: CPTII,S$GLB,, | Performed by: FAMILY MEDICINE

## 2021-01-11 PROCEDURE — 3075F SYST BP GE 130 - 139MM HG: CPT | Mod: CPTII,S$GLB,, | Performed by: FAMILY MEDICINE

## 2021-01-11 PROCEDURE — 99999 PR PBB SHADOW E&M-EST. PATIENT-LVL III: CPT | Mod: PBBFAC,,, | Performed by: FAMILY MEDICINE

## 2021-01-11 PROCEDURE — 99999 PR PBB SHADOW E&M-EST. PATIENT-LVL III: ICD-10-PCS | Mod: PBBFAC,,, | Performed by: FAMILY MEDICINE

## 2021-01-11 PROCEDURE — 3075F PR MOST RECENT SYSTOLIC BLOOD PRESS GE 130-139MM HG: ICD-10-PCS | Mod: CPTII,S$GLB,, | Performed by: FAMILY MEDICINE

## 2021-01-11 PROCEDURE — 1126F AMNT PAIN NOTED NONE PRSNT: CPT | Mod: S$GLB,,, | Performed by: FAMILY MEDICINE

## 2021-01-11 PROCEDURE — 99214 OFFICE O/P EST MOD 30 MIN: CPT | Mod: S$GLB,,, | Performed by: FAMILY MEDICINE

## 2021-01-11 RX ORDER — ATORVASTATIN CALCIUM 10 MG/1
10 TABLET, FILM COATED ORAL DAILY
Qty: 90 TABLET | Refills: 1 | Status: SHIPPED | OUTPATIENT
Start: 2021-01-11 | End: 2021-07-27 | Stop reason: SDUPTHER

## 2021-01-11 RX ORDER — HYDROCHLOROTHIAZIDE 25 MG/1
50 TABLET ORAL DAILY
Qty: 180 TABLET | Refills: 1 | Status: SHIPPED | OUTPATIENT
Start: 2021-01-11 | End: 2021-07-27 | Stop reason: SDUPTHER

## 2021-01-11 RX ORDER — POTASSIUM CHLORIDE 20 MEQ/1
20 TABLET, EXTENDED RELEASE ORAL DAILY
Qty: 90 TABLET | Refills: 1 | Status: SHIPPED | OUTPATIENT
Start: 2021-01-11 | End: 2021-03-02

## 2021-02-10 DIAGNOSIS — E87.6 HYPOKALEMIA: Primary | ICD-10-CM

## 2021-02-15 ENCOUNTER — PATIENT MESSAGE (OUTPATIENT)
Dept: INTERNAL MEDICINE | Facility: CLINIC | Age: 56
End: 2021-02-15

## 2021-02-24 ENCOUNTER — PATIENT MESSAGE (OUTPATIENT)
Dept: INTERNAL MEDICINE | Facility: CLINIC | Age: 56
End: 2021-02-24

## 2021-02-26 ENCOUNTER — LAB VISIT (OUTPATIENT)
Dept: LAB | Facility: HOSPITAL | Age: 56
End: 2021-02-26
Attending: FAMILY MEDICINE
Payer: COMMERCIAL

## 2021-02-26 DIAGNOSIS — E87.6 HYPOKALEMIA: ICD-10-CM

## 2021-02-26 PROCEDURE — 84132 ASSAY OF SERUM POTASSIUM: CPT

## 2021-02-26 PROCEDURE — 36415 COLL VENOUS BLD VENIPUNCTURE: CPT

## 2021-02-27 LAB — POTASSIUM SERPL-SCNC: 2.9 MMOL/L (ref 3.5–5.1)

## 2021-03-02 ENCOUNTER — OFFICE VISIT (OUTPATIENT)
Dept: INTERNAL MEDICINE | Facility: CLINIC | Age: 56
End: 2021-03-02
Payer: COMMERCIAL

## 2021-03-02 VITALS
SYSTOLIC BLOOD PRESSURE: 126 MMHG | WEIGHT: 120.56 LBS | HEART RATE: 90 BPM | OXYGEN SATURATION: 99 % | DIASTOLIC BLOOD PRESSURE: 80 MMHG | BODY MASS INDEX: 24.31 KG/M2 | HEIGHT: 59 IN | TEMPERATURE: 99 F

## 2021-03-02 DIAGNOSIS — E87.6 HYPOKALEMIA: ICD-10-CM

## 2021-03-02 DIAGNOSIS — I10 ESSENTIAL HYPERTENSION: Primary | ICD-10-CM

## 2021-03-02 PROCEDURE — 3074F SYST BP LT 130 MM HG: CPT | Mod: CPTII,S$GLB,, | Performed by: FAMILY MEDICINE

## 2021-03-02 PROCEDURE — 99213 PR OFFICE/OUTPT VISIT, EST, LEVL III, 20-29 MIN: ICD-10-PCS | Mod: S$GLB,,, | Performed by: FAMILY MEDICINE

## 2021-03-02 PROCEDURE — 3008F BODY MASS INDEX DOCD: CPT | Mod: CPTII,S$GLB,, | Performed by: FAMILY MEDICINE

## 2021-03-02 PROCEDURE — 3079F PR MOST RECENT DIASTOLIC BLOOD PRESSURE 80-89 MM HG: ICD-10-PCS | Mod: CPTII,S$GLB,, | Performed by: FAMILY MEDICINE

## 2021-03-02 PROCEDURE — 99999 PR PBB SHADOW E&M-EST. PATIENT-LVL III: ICD-10-PCS | Mod: PBBFAC,,, | Performed by: FAMILY MEDICINE

## 2021-03-02 PROCEDURE — 3074F PR MOST RECENT SYSTOLIC BLOOD PRESSURE < 130 MM HG: ICD-10-PCS | Mod: CPTII,S$GLB,, | Performed by: FAMILY MEDICINE

## 2021-03-02 PROCEDURE — 99213 OFFICE O/P EST LOW 20 MIN: CPT | Mod: S$GLB,,, | Performed by: FAMILY MEDICINE

## 2021-03-02 PROCEDURE — 99999 PR PBB SHADOW E&M-EST. PATIENT-LVL III: CPT | Mod: PBBFAC,,, | Performed by: FAMILY MEDICINE

## 2021-03-02 PROCEDURE — 3079F DIAST BP 80-89 MM HG: CPT | Mod: CPTII,S$GLB,, | Performed by: FAMILY MEDICINE

## 2021-03-02 PROCEDURE — 3008F PR BODY MASS INDEX (BMI) DOCUMENTED: ICD-10-PCS | Mod: CPTII,S$GLB,, | Performed by: FAMILY MEDICINE

## 2021-03-21 ENCOUNTER — PATIENT MESSAGE (OUTPATIENT)
Dept: INTERNAL MEDICINE | Facility: CLINIC | Age: 56
End: 2021-03-21

## 2021-05-31 ENCOUNTER — TELEPHONE (OUTPATIENT)
Dept: ADMINISTRATIVE | Facility: HOSPITAL | Age: 56
End: 2021-05-31

## 2021-06-16 ENCOUNTER — TELEPHONE (OUTPATIENT)
Dept: ADMINISTRATIVE | Facility: HOSPITAL | Age: 56
End: 2021-06-16

## 2021-07-27 ENCOUNTER — PATIENT MESSAGE (OUTPATIENT)
Dept: INTERNAL MEDICINE | Facility: CLINIC | Age: 56
End: 2021-07-27

## 2021-07-28 RX ORDER — ATORVASTATIN CALCIUM 10 MG/1
10 TABLET, FILM COATED ORAL DAILY
Qty: 90 TABLET | Refills: 1 | Status: SHIPPED | OUTPATIENT
Start: 2021-07-28 | End: 2022-02-11 | Stop reason: SDUPTHER

## 2021-07-28 RX ORDER — HYDROCHLOROTHIAZIDE 25 MG/1
50 TABLET ORAL DAILY
Qty: 180 TABLET | Refills: 1 | Status: SHIPPED | OUTPATIENT
Start: 2021-07-28 | End: 2022-02-11 | Stop reason: SDUPTHER

## 2021-08-02 ENCOUNTER — PATIENT OUTREACH (OUTPATIENT)
Dept: ADMINISTRATIVE | Facility: HOSPITAL | Age: 56
End: 2021-08-02

## 2021-08-02 ENCOUNTER — PATIENT MESSAGE (OUTPATIENT)
Dept: ADMINISTRATIVE | Facility: HOSPITAL | Age: 56
End: 2021-08-02

## 2021-10-11 ENCOUNTER — TELEPHONE (OUTPATIENT)
Dept: ADMINISTRATIVE | Facility: HOSPITAL | Age: 56
End: 2021-10-11

## 2021-12-27 ENCOUNTER — OFFICE VISIT (OUTPATIENT)
Dept: INTERNAL MEDICINE | Facility: CLINIC | Age: 56
End: 2021-12-27
Payer: COMMERCIAL

## 2021-12-27 ENCOUNTER — LAB VISIT (OUTPATIENT)
Dept: LAB | Facility: HOSPITAL | Age: 56
End: 2021-12-27
Attending: FAMILY MEDICINE
Payer: COMMERCIAL

## 2021-12-27 VITALS
HEART RATE: 85 BPM | HEIGHT: 59 IN | DIASTOLIC BLOOD PRESSURE: 88 MMHG | TEMPERATURE: 100 F | BODY MASS INDEX: 24.22 KG/M2 | OXYGEN SATURATION: 98 % | WEIGHT: 120.13 LBS | SYSTOLIC BLOOD PRESSURE: 134 MMHG

## 2021-12-27 DIAGNOSIS — E78.5 HYPERLIPIDEMIA, UNSPECIFIED HYPERLIPIDEMIA TYPE: ICD-10-CM

## 2021-12-27 DIAGNOSIS — I10 ESSENTIAL HYPERTENSION: ICD-10-CM

## 2021-12-27 DIAGNOSIS — Z28.39 IMMUNIZATION DEFICIENCY: ICD-10-CM

## 2021-12-27 DIAGNOSIS — Z12.11 COLON CANCER SCREENING: ICD-10-CM

## 2021-12-27 DIAGNOSIS — E87.6 HYPOKALEMIA: ICD-10-CM

## 2021-12-27 DIAGNOSIS — Z00.00 ANNUAL PHYSICAL EXAM: ICD-10-CM

## 2021-12-27 DIAGNOSIS — Z00.00 ANNUAL PHYSICAL EXAM: Primary | ICD-10-CM

## 2021-12-27 LAB
ALBUMIN SERPL BCP-MCNC: 4.2 G/DL (ref 3.5–5.2)
ALP SERPL-CCNC: 101 U/L (ref 55–135)
ALT SERPL W/O P-5'-P-CCNC: 23 U/L (ref 10–44)
ANION GAP SERPL CALC-SCNC: 9 MMOL/L (ref 8–16)
AST SERPL-CCNC: 28 U/L (ref 10–40)
BASOPHILS # BLD AUTO: 0.08 K/UL (ref 0–0.2)
BASOPHILS NFR BLD: 1 % (ref 0–1.9)
BILIRUB SERPL-MCNC: 0.8 MG/DL (ref 0.1–1)
BUN SERPL-MCNC: 11 MG/DL (ref 6–20)
CALCIUM SERPL-MCNC: 9.7 MG/DL (ref 8.7–10.5)
CHLORIDE SERPL-SCNC: 97 MMOL/L (ref 95–110)
CHOLEST SERPL-MCNC: 237 MG/DL (ref 120–199)
CHOLEST/HDLC SERPL: 3.4 {RATIO} (ref 2–5)
CO2 SERPL-SCNC: 31 MMOL/L (ref 23–29)
CREAT SERPL-MCNC: 0.8 MG/DL (ref 0.5–1.4)
DIFFERENTIAL METHOD: NORMAL
EOSINOPHIL # BLD AUTO: 0.2 K/UL (ref 0–0.5)
EOSINOPHIL NFR BLD: 2.4 % (ref 0–8)
ERYTHROCYTE [DISTWIDTH] IN BLOOD BY AUTOMATED COUNT: 12.7 % (ref 11.5–14.5)
EST. GFR  (AFRICAN AMERICAN): >60 ML/MIN/1.73 M^2
EST. GFR  (NON AFRICAN AMERICAN): >60 ML/MIN/1.73 M^2
GLUCOSE SERPL-MCNC: 91 MG/DL (ref 70–110)
HCT VFR BLD AUTO: 42.2 % (ref 37–48.5)
HDLC SERPL-MCNC: 70 MG/DL (ref 40–75)
HDLC SERPL: 29.5 % (ref 20–50)
HGB BLD-MCNC: 14.2 G/DL (ref 12–16)
IMM GRANULOCYTES # BLD AUTO: 0.02 K/UL (ref 0–0.04)
IMM GRANULOCYTES NFR BLD AUTO: 0.3 % (ref 0–0.5)
LDLC SERPL CALC-MCNC: 144 MG/DL (ref 63–159)
LYMPHOCYTES # BLD AUTO: 1.9 K/UL (ref 1–4.8)
LYMPHOCYTES NFR BLD: 23.9 % (ref 18–48)
MCH RBC QN AUTO: 29.3 PG (ref 27–31)
MCHC RBC AUTO-ENTMCNC: 33.6 G/DL (ref 32–36)
MCV RBC AUTO: 87 FL (ref 82–98)
MONOCYTES # BLD AUTO: 0.6 K/UL (ref 0.3–1)
MONOCYTES NFR BLD: 7.3 % (ref 4–15)
NEUTROPHILS # BLD AUTO: 5.1 K/UL (ref 1.8–7.7)
NEUTROPHILS NFR BLD: 65.1 % (ref 38–73)
NONHDLC SERPL-MCNC: 167 MG/DL
NRBC BLD-RTO: 0 /100 WBC
PLATELET # BLD AUTO: 299 K/UL (ref 150–450)
PMV BLD AUTO: 10.2 FL (ref 9.2–12.9)
POTASSIUM SERPL-SCNC: 3.4 MMOL/L (ref 3.5–5.1)
PROT SERPL-MCNC: 8.2 G/DL (ref 6–8.4)
RBC # BLD AUTO: 4.85 M/UL (ref 4–5.4)
SODIUM SERPL-SCNC: 137 MMOL/L (ref 136–145)
TRIGL SERPL-MCNC: 115 MG/DL (ref 30–150)
TSH SERPL DL<=0.005 MIU/L-ACNC: 2.35 UIU/ML (ref 0.4–4)
WBC # BLD AUTO: 7.77 K/UL (ref 3.9–12.7)

## 2021-12-27 PROCEDURE — 3075F PR MOST RECENT SYSTOLIC BLOOD PRESS GE 130-139MM HG: ICD-10-PCS | Mod: CPTII,S$GLB,, | Performed by: FAMILY MEDICINE

## 2021-12-27 PROCEDURE — 99214 PR OFFICE/OUTPT VISIT, EST, LEVL IV, 30-39 MIN: ICD-10-PCS | Mod: 25,S$GLB,, | Performed by: FAMILY MEDICINE

## 2021-12-27 PROCEDURE — 99999 PR PBB SHADOW E&M-EST. PATIENT-LVL III: CPT | Mod: PBBFAC,,, | Performed by: FAMILY MEDICINE

## 2021-12-27 PROCEDURE — 90471 IMMUNIZATION ADMIN: CPT | Mod: S$GLB,,, | Performed by: FAMILY MEDICINE

## 2021-12-27 PROCEDURE — 3008F PR BODY MASS INDEX (BMI) DOCUMENTED: ICD-10-PCS | Mod: CPTII,S$GLB,, | Performed by: FAMILY MEDICINE

## 2021-12-27 PROCEDURE — 99999 PR PBB SHADOW E&M-EST. PATIENT-LVL III: ICD-10-PCS | Mod: PBBFAC,,, | Performed by: FAMILY MEDICINE

## 2021-12-27 PROCEDURE — 80053 COMPREHEN METABOLIC PANEL: CPT | Performed by: FAMILY MEDICINE

## 2021-12-27 PROCEDURE — 3008F BODY MASS INDEX DOCD: CPT | Mod: CPTII,S$GLB,, | Performed by: FAMILY MEDICINE

## 2021-12-27 PROCEDURE — 90686 IIV4 VACC NO PRSV 0.5 ML IM: CPT | Mod: S$GLB,,, | Performed by: FAMILY MEDICINE

## 2021-12-27 PROCEDURE — 80061 LIPID PANEL: CPT | Performed by: FAMILY MEDICINE

## 2021-12-27 PROCEDURE — 84443 ASSAY THYROID STIM HORMONE: CPT | Performed by: FAMILY MEDICINE

## 2021-12-27 PROCEDURE — 99214 OFFICE O/P EST MOD 30 MIN: CPT | Mod: 25,S$GLB,, | Performed by: FAMILY MEDICINE

## 2021-12-27 PROCEDURE — 3075F SYST BP GE 130 - 139MM HG: CPT | Mod: CPTII,S$GLB,, | Performed by: FAMILY MEDICINE

## 2021-12-27 PROCEDURE — 86803 HEPATITIS C AB TEST: CPT | Performed by: FAMILY MEDICINE

## 2021-12-27 PROCEDURE — 90471 FLU VACCINE (QUAD) GREATER THAN OR EQUAL TO 3YO PRESERVATIVE FREE IM: ICD-10-PCS | Mod: S$GLB,,, | Performed by: FAMILY MEDICINE

## 2021-12-27 PROCEDURE — 3079F DIAST BP 80-89 MM HG: CPT | Mod: CPTII,S$GLB,, | Performed by: FAMILY MEDICINE

## 2021-12-27 PROCEDURE — 36415 COLL VENOUS BLD VENIPUNCTURE: CPT | Performed by: FAMILY MEDICINE

## 2021-12-27 PROCEDURE — 90686 FLU VACCINE (QUAD) GREATER THAN OR EQUAL TO 3YO PRESERVATIVE FREE IM: ICD-10-PCS | Mod: S$GLB,,, | Performed by: FAMILY MEDICINE

## 2021-12-27 PROCEDURE — 3079F PR MOST RECENT DIASTOLIC BLOOD PRESSURE 80-89 MM HG: ICD-10-PCS | Mod: CPTII,S$GLB,, | Performed by: FAMILY MEDICINE

## 2021-12-27 PROCEDURE — 85025 COMPLETE CBC W/AUTO DIFF WBC: CPT | Performed by: FAMILY MEDICINE

## 2021-12-28 LAB — HCV AB SERPL QL IA: NEGATIVE

## 2022-01-09 ENCOUNTER — PATIENT MESSAGE (OUTPATIENT)
Dept: INTERNAL MEDICINE | Facility: CLINIC | Age: 57
End: 2022-01-09
Payer: COMMERCIAL

## 2022-02-14 RX ORDER — HYDROCHLOROTHIAZIDE 25 MG/1
50 TABLET ORAL DAILY
Qty: 180 TABLET | Refills: 1 | Status: SHIPPED | OUTPATIENT
Start: 2022-02-14 | End: 2022-09-15 | Stop reason: SDUPTHER

## 2022-02-14 RX ORDER — ATORVASTATIN CALCIUM 10 MG/1
10 TABLET, FILM COATED ORAL DAILY
Qty: 90 TABLET | Refills: 1 | Status: SHIPPED | OUTPATIENT
Start: 2022-02-14 | End: 2022-09-15 | Stop reason: SDUPTHER

## 2022-03-16 DIAGNOSIS — Z12.31 OTHER SCREENING MAMMOGRAM: ICD-10-CM

## 2022-03-18 ENCOUNTER — PATIENT OUTREACH (OUTPATIENT)
Dept: ADMINISTRATIVE | Facility: HOSPITAL | Age: 57
End: 2022-03-18
Payer: COMMERCIAL

## 2022-03-28 PROBLEM — Z00.00 ANNUAL PHYSICAL EXAM: Status: RESOLVED | Noted: 2018-06-07 | Resolved: 2022-03-28

## 2022-04-26 ENCOUNTER — PATIENT MESSAGE (OUTPATIENT)
Dept: ADMINISTRATIVE | Facility: HOSPITAL | Age: 57
End: 2022-04-26
Payer: COMMERCIAL

## 2022-06-03 ENCOUNTER — PATIENT OUTREACH (OUTPATIENT)
Dept: ADMINISTRATIVE | Facility: HOSPITAL | Age: 57
End: 2022-06-03
Payer: COMMERCIAL

## 2022-08-03 ENCOUNTER — PATIENT MESSAGE (OUTPATIENT)
Dept: ADMINISTRATIVE | Facility: HOSPITAL | Age: 57
End: 2022-08-03
Payer: COMMERCIAL

## 2022-08-09 ENCOUNTER — PATIENT MESSAGE (OUTPATIENT)
Dept: ADMINISTRATIVE | Facility: HOSPITAL | Age: 57
End: 2022-08-09
Payer: COMMERCIAL

## 2022-09-18 RX ORDER — ATORVASTATIN CALCIUM 10 MG/1
10 TABLET, FILM COATED ORAL DAILY
Qty: 90 TABLET | Refills: 1 | Status: SHIPPED | OUTPATIENT
Start: 2022-09-18 | End: 2023-02-27 | Stop reason: SDUPTHER

## 2022-09-18 RX ORDER — HYDROCHLOROTHIAZIDE 25 MG/1
50 TABLET ORAL DAILY
Qty: 180 TABLET | Refills: 1 | Status: SHIPPED | OUTPATIENT
Start: 2022-09-18 | End: 2023-02-27 | Stop reason: SDUPTHER

## 2022-10-04 ENCOUNTER — PATIENT MESSAGE (OUTPATIENT)
Dept: ADMINISTRATIVE | Facility: HOSPITAL | Age: 57
End: 2022-10-04
Payer: COMMERCIAL

## 2022-11-12 ENCOUNTER — OFFICE VISIT (OUTPATIENT)
Dept: INTERNAL MEDICINE | Facility: CLINIC | Age: 57
End: 2022-11-12
Payer: COMMERCIAL

## 2022-11-12 VITALS — SYSTOLIC BLOOD PRESSURE: 135 MMHG | DIASTOLIC BLOOD PRESSURE: 74 MMHG

## 2022-11-12 DIAGNOSIS — A08.4 VIRAL GASTROENTERITIS: Primary | ICD-10-CM

## 2022-11-12 PROCEDURE — 99213 OFFICE O/P EST LOW 20 MIN: CPT | Mod: 95,,, | Performed by: FAMILY MEDICINE

## 2022-11-12 PROCEDURE — 99213 PR OFFICE/OUTPT VISIT, EST, LEVL III, 20-29 MIN: ICD-10-PCS | Mod: 95,,, | Performed by: FAMILY MEDICINE

## 2022-11-12 PROCEDURE — 3075F SYST BP GE 130 - 139MM HG: CPT | Mod: CPTII,95,, | Performed by: FAMILY MEDICINE

## 2022-11-12 PROCEDURE — 3075F PR MOST RECENT SYSTOLIC BLOOD PRESS GE 130-139MM HG: ICD-10-PCS | Mod: CPTII,95,, | Performed by: FAMILY MEDICINE

## 2022-11-12 PROCEDURE — 3078F PR MOST RECENT DIASTOLIC BLOOD PRESSURE < 80 MM HG: ICD-10-PCS | Mod: CPTII,95,, | Performed by: FAMILY MEDICINE

## 2022-11-12 PROCEDURE — 3078F DIAST BP <80 MM HG: CPT | Mod: CPTII,95,, | Performed by: FAMILY MEDICINE

## 2022-11-12 NOTE — LETTER
November 12, 2022      [address]  [address] [facility]  [place] [address]  Phone: [phone]  Fax: [phone]       Patient: Juan C Dennis   YOB: 1965  Date of Visit: 11/12/2022    To Whom It May Concern:    Gonzales Dennis  was at [facility] on 11/12/2022. Patient is recovering from recent illness. Please excuse her from 11/9/22-11/11/22.    Sincerely,    Gene Hahn MD

## 2022-11-12 NOTE — PROGRESS NOTES
Subjective:       Patient ID: Juan C Dennis is a 57 y.o. female.    Chief Complaint: No chief complaint on file.    HPI    The patient location is: home  The chief complaint leading to consultation is: N/V/diarrhea    Visit type: audiovisual    Face to Face time with patient: 15   minutes of total time spent on the encounter, which includes face to face time and non-face to face time preparing to see the patient (eg, review of tests), Obtaining and/or reviewing separately obtained history, Documenting clinical information in the electronic or other health record, Independently interpreting results (not separately reported) and communicating results to the patient/family/caregiver, or Care coordination (not separately reported).         Each patient to whom he or she provides medical services by telemedicine is:  (1) informed of the relationship between the physician and patient and the respective role of any other health care provider with respect to management of the patient; and (2) notified that he or she may decline to receive medical services by telemedicine and may withdraw from such care at any time.    Notes:       Patient Active Problem List   Diagnosis    Family history of CVA    Family history of essential hypertension    Hyperlipidemia    Proteinuria    Elevated serum protein level    Immunization deficiency    Elevated blood pressure reading    Essential hypertension    Hypokalemia       No past medical history on file.    Past Surgical History:   Procedure Laterality Date     SECTION         Family History   Problem Relation Age of Onset    No Known Problems Mother     Hypertension Father     No Known Problems Daughter        Social History     Tobacco Use   Smoking Status Never   Smokeless Tobacco Never       Wt Readings from Last 5 Encounters:   21 54.5 kg (120 lb 2.4 oz)   21 54.7 kg (120 lb 9.5 oz)   21 55.3 kg (121 lb 14.6 oz)   20 54.3 kg (119 lb 11.4 oz)    11/25/20 54.4 kg (119 lb 14.9 oz)       For further HPI details, see assessment and plan.    Review of Systems   Constitutional:  Negative for activity change and unexpected weight change.   HENT:  Negative for hearing loss, rhinorrhea and trouble swallowing.    Eyes:  Negative for discharge and visual disturbance.   Respiratory:  Negative for chest tightness and wheezing.    Cardiovascular:  Negative for chest pain and palpitations.   Gastrointestinal:  Positive for diarrhea and vomiting. Negative for blood in stool and constipation.   Endocrine: Negative for polydipsia and polyuria.   Genitourinary:  Negative for difficulty urinating, dysuria, hematuria and menstrual problem.   Musculoskeletal:  Negative for arthralgias, joint swelling and neck pain.   Neurological:  Positive for weakness. Negative for headaches.   Psychiatric/Behavioral:  Negative for confusion and dysphoric mood.      Objective:      Vitals:    11/12/22 0808   BP: 135/74       Physical Exam  Constitutional:       General: She is not in acute distress.     Appearance: She is not ill-appearing.   Pulmonary:      Effort: Pulmonary effort is normal. No respiratory distress.   Neurological:      General: No focal deficit present.      Mental Status: She is alert.   Psychiatric:         Mood and Affect: Mood normal.         Behavior: Behavior normal.       Assessment:       1. Viral gastroenteritis        Plan:   Viral gastroenteritis    Viral Gastroenteritis    3 days  NVD  Used OTC anti-diarrheals  Yesterday she felt bad -   This morning she is feeling better than yesterday    No blood in diarrhea  Its just watery.    She has been able to eat - but porridge.    She is trying to get adequate fluids - using electrolytes flavored water -     No fever, no chills  No sick contacts  Some achiness - 2/10 pain rating in arms   Some sneezing - but just once  Breathing is good.    Her BP went down 117/17 - today its 135/74.    She feels improved , I suspect  she is actively recovering and on the mend- offered zofran - declined as she doesn't need she feels it.    Encouraged hydration.      Needs work excuse.  11/9- 11/11/222

## 2022-12-13 ENCOUNTER — PATIENT OUTREACH (OUTPATIENT)
Dept: ADMINISTRATIVE | Facility: HOSPITAL | Age: 57
End: 2022-12-13
Payer: COMMERCIAL

## 2022-12-13 NOTE — PROGRESS NOTES
NON COMPLAINT PAP: Tried calling patient. No answer. Left voicemail asking for a return call. No records found.

## 2022-12-13 NOTE — PROGRESS NOTES
Working Mammogram Report:     Pt overdue for mammogram. Called to offer scheduling. Pt asked to defer mammogram at this time.

## 2023-01-04 DIAGNOSIS — I10 ESSENTIAL HYPERTENSION: ICD-10-CM

## 2023-01-25 ENCOUNTER — PATIENT MESSAGE (OUTPATIENT)
Dept: ADMINISTRATIVE | Facility: HOSPITAL | Age: 58
End: 2023-01-25
Payer: COMMERCIAL

## 2023-02-17 ENCOUNTER — PATIENT OUTREACH (OUTPATIENT)
Dept: ADMINISTRATIVE | Facility: HOSPITAL | Age: 58
End: 2023-02-17
Payer: COMMERCIAL

## 2023-02-21 ENCOUNTER — PATIENT MESSAGE (OUTPATIENT)
Dept: INTERNAL MEDICINE | Facility: CLINIC | Age: 58
End: 2023-02-21
Payer: COMMERCIAL

## 2023-02-21 ENCOUNTER — TELEPHONE (OUTPATIENT)
Dept: INTERNAL MEDICINE | Facility: CLINIC | Age: 58
End: 2023-02-21
Payer: COMMERCIAL

## 2023-02-21 NOTE — TELEPHONE ENCOUNTER
----- Message from Anabella Garcias sent at 2/21/2023  7:15 AM CST -----  Regarding: returning call  Contact: pt  Type:  Patient Returning Call    Who Called: Juan C Dennis  Who Left Message for Patient: Carmen  Does the patient know what this is regarding?: appt   Would the patient rather a call back or a response via [employer]?  Call back   Best Call Back Number: [phone]  Additional Information:

## 2023-02-21 NOTE — TELEPHONE ENCOUNTER
Patient notified appointment with Dr. Hahn has cancelled and scheduled with her pcp. Patient verbally agreed.

## 2023-02-27 ENCOUNTER — OFFICE VISIT (OUTPATIENT)
Dept: INTERNAL MEDICINE | Facility: CLINIC | Age: 58
End: 2023-02-27
Payer: COMMERCIAL

## 2023-02-27 ENCOUNTER — LAB VISIT (OUTPATIENT)
Dept: LAB | Facility: HOSPITAL | Age: 58
End: 2023-02-27
Attending: FAMILY MEDICINE
Payer: COMMERCIAL

## 2023-02-27 VITALS
OXYGEN SATURATION: 98 % | HEART RATE: 92 BPM | DIASTOLIC BLOOD PRESSURE: 70 MMHG | WEIGHT: 121.69 LBS | SYSTOLIC BLOOD PRESSURE: 128 MMHG | TEMPERATURE: 98 F | HEIGHT: 59 IN | BODY MASS INDEX: 24.53 KG/M2

## 2023-02-27 DIAGNOSIS — E78.5 HYPERLIPIDEMIA, UNSPECIFIED HYPERLIPIDEMIA TYPE: ICD-10-CM

## 2023-02-27 DIAGNOSIS — Z00.00 ANNUAL PHYSICAL EXAM: ICD-10-CM

## 2023-02-27 DIAGNOSIS — Z12.11 COLON CANCER SCREENING: ICD-10-CM

## 2023-02-27 DIAGNOSIS — Z00.00 ANNUAL PHYSICAL EXAM: Primary | ICD-10-CM

## 2023-02-27 DIAGNOSIS — I10 ESSENTIAL HYPERTENSION: ICD-10-CM

## 2023-02-27 DIAGNOSIS — E87.6 HYPOKALEMIA: ICD-10-CM

## 2023-02-27 DIAGNOSIS — Z82.3 FAMILY HISTORY OF CVA: ICD-10-CM

## 2023-02-27 DIAGNOSIS — Z82.49 FAMILY HISTORY OF ESSENTIAL HYPERTENSION: ICD-10-CM

## 2023-02-27 PROBLEM — R03.0 ELEVATED BLOOD PRESSURE READING: Status: RESOLVED | Noted: 2020-11-25 | Resolved: 2023-02-27

## 2023-02-27 LAB
BASOPHILS # BLD AUTO: 0.07 K/UL (ref 0–0.2)
BASOPHILS NFR BLD: 0.9 % (ref 0–1.9)
DIFFERENTIAL METHOD: NORMAL
EOSINOPHIL # BLD AUTO: 0.3 K/UL (ref 0–0.5)
EOSINOPHIL NFR BLD: 3.6 % (ref 0–8)
ERYTHROCYTE [DISTWIDTH] IN BLOOD BY AUTOMATED COUNT: 12.6 % (ref 11.5–14.5)
HCT VFR BLD AUTO: 43.5 % (ref 37–48.5)
HGB BLD-MCNC: 14.1 G/DL (ref 12–16)
IMM GRANULOCYTES # BLD AUTO: 0.02 K/UL (ref 0–0.04)
IMM GRANULOCYTES NFR BLD AUTO: 0.3 % (ref 0–0.5)
LYMPHOCYTES # BLD AUTO: 1.9 K/UL (ref 1–4.8)
LYMPHOCYTES NFR BLD: 25.8 % (ref 18–48)
MCH RBC QN AUTO: 28.5 PG (ref 27–31)
MCHC RBC AUTO-ENTMCNC: 32.4 G/DL (ref 32–36)
MCV RBC AUTO: 88 FL (ref 82–98)
MONOCYTES # BLD AUTO: 0.6 K/UL (ref 0.3–1)
MONOCYTES NFR BLD: 7.5 % (ref 4–15)
NEUTROPHILS # BLD AUTO: 4.6 K/UL (ref 1.8–7.7)
NEUTROPHILS NFR BLD: 61.9 % (ref 38–73)
NRBC BLD-RTO: 0 /100 WBC
PLATELET # BLD AUTO: 225 K/UL (ref 150–450)
PMV BLD AUTO: 10.9 FL (ref 9.2–12.9)
RBC # BLD AUTO: 4.95 M/UL (ref 4–5.4)
WBC # BLD AUTO: 7.47 K/UL (ref 3.9–12.7)

## 2023-02-27 PROCEDURE — 3078F DIAST BP <80 MM HG: CPT | Mod: CPTII,S$GLB,, | Performed by: FAMILY MEDICINE

## 2023-02-27 PROCEDURE — 80061 LIPID PANEL: CPT | Performed by: FAMILY MEDICINE

## 2023-02-27 PROCEDURE — 85025 COMPLETE CBC W/AUTO DIFF WBC: CPT | Performed by: FAMILY MEDICINE

## 2023-02-27 PROCEDURE — 3074F PR MOST RECENT SYSTOLIC BLOOD PRESSURE < 130 MM HG: ICD-10-PCS | Mod: CPTII,S$GLB,, | Performed by: FAMILY MEDICINE

## 2023-02-27 PROCEDURE — 99214 OFFICE O/P EST MOD 30 MIN: CPT | Mod: S$GLB,,, | Performed by: FAMILY MEDICINE

## 2023-02-27 PROCEDURE — 84443 ASSAY THYROID STIM HORMONE: CPT | Performed by: FAMILY MEDICINE

## 2023-02-27 PROCEDURE — 1159F PR MEDICATION LIST DOCUMENTED IN MEDICAL RECORD: ICD-10-PCS | Mod: CPTII,S$GLB,, | Performed by: FAMILY MEDICINE

## 2023-02-27 PROCEDURE — 99214 PR OFFICE/OUTPT VISIT, EST, LEVL IV, 30-39 MIN: ICD-10-PCS | Mod: S$GLB,,, | Performed by: FAMILY MEDICINE

## 2023-02-27 PROCEDURE — 80053 COMPREHEN METABOLIC PANEL: CPT | Performed by: FAMILY MEDICINE

## 2023-02-27 PROCEDURE — 99999 PR PBB SHADOW E&M-EST. PATIENT-LVL III: ICD-10-PCS | Mod: PBBFAC,,, | Performed by: FAMILY MEDICINE

## 2023-02-27 PROCEDURE — 3008F PR BODY MASS INDEX (BMI) DOCUMENTED: ICD-10-PCS | Mod: CPTII,S$GLB,, | Performed by: FAMILY MEDICINE

## 2023-02-27 PROCEDURE — 36415 COLL VENOUS BLD VENIPUNCTURE: CPT | Performed by: FAMILY MEDICINE

## 2023-02-27 PROCEDURE — 99999 PR PBB SHADOW E&M-EST. PATIENT-LVL III: CPT | Mod: PBBFAC,,, | Performed by: FAMILY MEDICINE

## 2023-02-27 PROCEDURE — 1159F MED LIST DOCD IN RCRD: CPT | Mod: CPTII,S$GLB,, | Performed by: FAMILY MEDICINE

## 2023-02-27 PROCEDURE — 3008F BODY MASS INDEX DOCD: CPT | Mod: CPTII,S$GLB,, | Performed by: FAMILY MEDICINE

## 2023-02-27 PROCEDURE — 3078F PR MOST RECENT DIASTOLIC BLOOD PRESSURE < 80 MM HG: ICD-10-PCS | Mod: CPTII,S$GLB,, | Performed by: FAMILY MEDICINE

## 2023-02-27 PROCEDURE — 3074F SYST BP LT 130 MM HG: CPT | Mod: CPTII,S$GLB,, | Performed by: FAMILY MEDICINE

## 2023-02-27 RX ORDER — HYDROCHLOROTHIAZIDE 25 MG/1
50 TABLET ORAL DAILY
Qty: 180 TABLET | Refills: 3 | Status: SHIPPED | OUTPATIENT
Start: 2023-02-27 | End: 2024-03-11 | Stop reason: SDUPTHER

## 2023-02-27 RX ORDER — ATORVASTATIN CALCIUM 10 MG/1
10 TABLET, FILM COATED ORAL DAILY
Qty: 90 TABLET | Refills: 3 | Status: SHIPPED | OUTPATIENT
Start: 2023-02-27 | End: 2023-02-28

## 2023-02-27 NOTE — PROGRESS NOTES
Subjective:       Patient ID: Juan C Dennis is a 58 y.o. female.    Chief Complaint: Annual Exam    Annual exam.  Follow-up hypertension hyperlipidemia hypokalemia she denies headache chest pain palpitations shortness of breath edema.  She denies any urinary symptoms.    Review of Systems   Constitutional:  Negative for activity change, appetite change, fatigue and unexpected weight change.   HENT:  Negative for congestion.    Eyes:  Negative for visual disturbance.   Respiratory:  Negative for cough, chest tightness, shortness of breath and wheezing.    Cardiovascular:  Negative for chest pain, palpitations and leg swelling.   Gastrointestinal:  Negative for abdominal distention, abdominal pain, constipation and diarrhea.   Genitourinary:  Negative for difficulty urinating, dysuria, frequency, hematuria and urgency.   Musculoskeletal:  Positive for myalgias.        Occasional scapula muscle soreness that she feels stress related   Skin:  Negative for rash.   Neurological:  Negative for dizziness, weakness, light-headedness and headaches.     Objective:      Physical Exam  Constitutional:       General: She is not in acute distress.     Appearance: She is not ill-appearing or diaphoretic.   HENT:      Right Ear: Tympanic membrane and ear canal normal.      Left Ear: Tympanic membrane and ear canal normal.      Nose: Nose normal. No congestion.      Mouth/Throat:      Mouth: Mucous membranes are moist.      Pharynx: No oropharyngeal exudate or posterior oropharyngeal erythema.   Eyes:      Conjunctiva/sclera: Conjunctivae normal.   Neck:      Comments: 2+carotids normal thyroid  Cardiovascular:      Rate and Rhythm: Normal rate and regular rhythm.      Heart sounds: No murmur heard.    No gallop.   Pulmonary:      Effort: Pulmonary effort is normal. No respiratory distress.      Breath sounds: No wheezing, rhonchi or rales.   Abdominal:      General: There is no distension.      Palpations: Abdomen is soft.  There is no mass.      Tenderness: There is no abdominal tenderness.   Musculoskeletal:         General: No deformity.      Cervical back: No rigidity.   Skin:     General: Skin is warm and dry.      Coloration: Skin is not pale.      Findings: No erythema.   Neurological:      Mental Status: She is alert and oriented to person, place, and time.   Psychiatric:         Mood and Affect: Mood normal.         Thought Content: Thought content normal.         Judgment: Judgment normal.       Lab Visit on 12/27/2021   Component Date Value Ref Range Status    WBC 12/27/2021 7.77  3.90 - 12.70 K/uL Final    RBC 12/27/2021 4.85  4.00 - 5.40 M/uL Final    Hemoglobin 12/27/2021 14.2  12.0 - 16.0 g/dL Final    Hematocrit 12/27/2021 42.2  37.0 - 48.5 % Final    MCV 12/27/2021 87  82 - 98 fL Final    MCH 12/27/2021 29.3  27.0 - 31.0 pg Final    MCHC 12/27/2021 33.6  32.0 - 36.0 g/dL Final    RDW 12/27/2021 12.7  11.5 - 14.5 % Final    Platelets 12/27/2021 299  150 - 450 K/uL Final    MPV 12/27/2021 10.2  9.2 - 12.9 fL Final    Immature Granulocytes 12/27/2021 0.3  0.0 - 0.5 % Final    Gran # (ANC) 12/27/2021 5.1  1.8 - 7.7 K/uL Final    Immature Grans (Abs) 12/27/2021 0.02  0.00 - 0.04 K/uL Final    Lymph # 12/27/2021 1.9  1.0 - 4.8 K/uL Final    Mono # 12/27/2021 0.6  0.3 - 1.0 K/uL Final    Eos # 12/27/2021 0.2  0.0 - 0.5 K/uL Final    Baso # 12/27/2021 0.08  0.00 - 0.20 K/uL Final    nRBC 12/27/2021 0  0 /100 WBC Final    Gran % 12/27/2021 65.1  38.0 - 73.0 % Final    Lymph % 12/27/2021 23.9  18.0 - 48.0 % Final    Mono % 12/27/2021 7.3  4.0 - 15.0 % Final    Eosinophil % 12/27/2021 2.4  0.0 - 8.0 % Final    Basophil % 12/27/2021 1.0  0.0 - 1.9 % Final    Differential Method 12/27/2021 Automated   Final    Sodium 12/27/2021 137  136 - 145 mmol/L Final    Potassium 12/27/2021 3.4 (L)  3.5 - 5.1 mmol/L Final    Chloride 12/27/2021 97  95 - 110 mmol/L Final    CO2 12/27/2021 31 (H)  23 - 29 mmol/L Final    Glucose 12/27/2021 91   70 - 110 mg/dL Final    BUN 12/27/2021 11  6 - 20 mg/dL Final    Creatinine 12/27/2021 0.8  0.5 - 1.4 mg/dL Final    Calcium 12/27/2021 9.7  8.7 - 10.5 mg/dL Final    Total Protein 12/27/2021 8.2  6.0 - 8.4 g/dL Final    Albumin 12/27/2021 4.2  3.5 - 5.2 g/dL Final    Total Bilirubin 12/27/2021 0.8  0.1 - 1.0 mg/dL Final    Alkaline Phosphatase 12/27/2021 101  55 - 135 U/L Final    AST 12/27/2021 28  10 - 40 U/L Final    ALT 12/27/2021 23  10 - 44 U/L Final    Anion Gap 12/27/2021 9  8 - 16 mmol/L Final    eGFR if African American 12/27/2021 >60.0  >60 mL/min/1.73 m^2 Final    eGFR if non African American 12/27/2021 >60.0  >60 mL/min/1.73 m^2 Final    Cholesterol 12/27/2021 237 (H)  120 - 199 mg/dL Final    Triglycerides 12/27/2021 115  30 - 150 mg/dL Final    HDL 12/27/2021 70  40 - 75 mg/dL Final    LDL Cholesterol 12/27/2021 144.0  63.0 - 159.0 mg/dL Final    HDL/Cholesterol Ratio 12/27/2021 29.5  20.0 - 50.0 % Final    Total Cholesterol/HDL Ratio 12/27/2021 3.4  2.0 - 5.0 Final    Non-HDL Cholesterol 12/27/2021 167  mg/dL Final    TSH 12/27/2021 2.346  0.400 - 4.000 uIU/mL Final    Hepatitis C Ab 12/27/2021 Negative  Negative Final     Assessment:       1. Annual physical exam    2. Colon cancer screening    3. Family history of CVA    4. Essential hypertension    5. Hyperlipidemia, unspecified hyperlipidemia type    6. Hypokalemia    7. Family history of essential hypertension          Plan:   She declined mammogram and cervical cancer screening.  Lab was ordered including Cologuard test.  Medications refilled follow-up in 1 year      Annual physical exam  -     CBC Auto Differential; Future; Expected date: 02/27/2023  -     Urinalysis; Future; Expected date: 02/27/2023  -     Comprehensive Metabolic Panel; Future; Expected date: 02/27/2023  -     Lipid Panel; Future; Expected date: 02/27/2023  -     TSH; Future; Expected date: 02/27/2023    Colon cancer screening  -     Cologuard Screening (Multitarget Stool  DNA); Future; Expected date: 02/27/2023    Family history of CVA    Essential hypertension    Hyperlipidemia, unspecified hyperlipidemia type    Hypokalemia    Family history of essential hypertension    Other orders  -     hydroCHLOROthiazide (HYDRODIURIL) 25 MG tablet; Take 2 tablets (50 mg total) by mouth once daily.  Dispense: 180 tablet; Refill: 3  -     atorvastatin (LIPITOR) 10 MG tablet; Take 1 tablet (10 mg total) by mouth once daily.  Dispense: 90 tablet; Refill: 3

## 2023-02-28 LAB
ALBUMIN SERPL BCP-MCNC: 4.3 G/DL (ref 3.5–5.2)
ALBUMIN SERPL BCP-MCNC: 4.3 G/DL (ref 3.5–5.2)
ALP SERPL-CCNC: 108 U/L (ref 55–135)
ALP SERPL-CCNC: 108 U/L (ref 55–135)
ALT SERPL W/O P-5'-P-CCNC: 21 U/L (ref 10–44)
ALT SERPL W/O P-5'-P-CCNC: 21 U/L (ref 10–44)
ANION GAP SERPL CALC-SCNC: 13 MMOL/L (ref 8–16)
ANION GAP SERPL CALC-SCNC: 13 MMOL/L (ref 8–16)
AST SERPL-CCNC: 31 U/L (ref 10–40)
AST SERPL-CCNC: 31 U/L (ref 10–40)
BILIRUB SERPL-MCNC: 0.6 MG/DL (ref 0.1–1)
BILIRUB SERPL-MCNC: 0.6 MG/DL (ref 0.1–1)
BUN SERPL-MCNC: 12 MG/DL (ref 6–20)
BUN SERPL-MCNC: 12 MG/DL (ref 6–20)
CALCIUM SERPL-MCNC: 9.7 MG/DL (ref 8.7–10.5)
CALCIUM SERPL-MCNC: 9.7 MG/DL (ref 8.7–10.5)
CHLORIDE SERPL-SCNC: 98 MMOL/L (ref 95–110)
CHLORIDE SERPL-SCNC: 98 MMOL/L (ref 95–110)
CHOLEST SERPL-MCNC: 253 MG/DL (ref 120–199)
CHOLEST/HDLC SERPL: 3.6 {RATIO} (ref 2–5)
CO2 SERPL-SCNC: 27 MMOL/L (ref 23–29)
CO2 SERPL-SCNC: 27 MMOL/L (ref 23–29)
CREAT SERPL-MCNC: 0.9 MG/DL (ref 0.5–1.4)
CREAT SERPL-MCNC: 0.9 MG/DL (ref 0.5–1.4)
EST. GFR  (NO RACE VARIABLE): >60 ML/MIN/1.73 M^2
EST. GFR  (NO RACE VARIABLE): >60 ML/MIN/1.73 M^2
GLUCOSE SERPL-MCNC: 101 MG/DL (ref 70–110)
GLUCOSE SERPL-MCNC: 101 MG/DL (ref 70–110)
HDLC SERPL-MCNC: 71 MG/DL (ref 40–75)
HDLC SERPL: 28.1 % (ref 20–50)
LDLC SERPL CALC-MCNC: 149.2 MG/DL (ref 63–159)
NONHDLC SERPL-MCNC: 182 MG/DL
POTASSIUM SERPL-SCNC: 3.9 MMOL/L (ref 3.5–5.1)
POTASSIUM SERPL-SCNC: 3.9 MMOL/L (ref 3.5–5.1)
PROT SERPL-MCNC: 8.5 G/DL (ref 6–8.4)
PROT SERPL-MCNC: 8.5 G/DL (ref 6–8.4)
SODIUM SERPL-SCNC: 138 MMOL/L (ref 136–145)
SODIUM SERPL-SCNC: 138 MMOL/L (ref 136–145)
TRIGL SERPL-MCNC: 164 MG/DL (ref 30–150)
TSH SERPL DL<=0.005 MIU/L-ACNC: 3.06 UIU/ML (ref 0.4–4)

## 2023-02-28 RX ORDER — ATORVASTATIN CALCIUM 20 MG/1
20 TABLET, FILM COATED ORAL DAILY
Qty: 90 TABLET | Refills: 3
Start: 2023-02-28 | End: 2024-02-28

## 2023-04-05 RX ORDER — ATORVASTATIN CALCIUM 10 MG/1
10 TABLET, FILM COATED ORAL DAILY
Qty: 90 TABLET | Refills: 3 | Status: SHIPPED | OUTPATIENT
Start: 2023-04-05 | End: 2024-04-10

## 2023-04-05 NOTE — TELEPHONE ENCOUNTER
No new care gaps identified.  [facility] Embedded Care Gaps. Reference number: [id number]. 4/05/2023   12:39:41 PM CDT

## 2023-04-19 ENCOUNTER — PATIENT MESSAGE (OUTPATIENT)
Dept: ADMINISTRATIVE | Facility: HOSPITAL | Age: 58
End: 2023-04-19
Payer: COMMERCIAL

## 2023-05-29 PROBLEM — Z00.00 ANNUAL PHYSICAL EXAM: Status: RESOLVED | Noted: 2018-06-07 | Resolved: 2023-05-29

## 2023-06-30 ENCOUNTER — PATIENT OUTREACH (OUTPATIENT)
Dept: ADMINISTRATIVE | Facility: HOSPITAL | Age: 58
End: 2023-06-30
Payer: COMMERCIAL

## 2024-02-15 ENCOUNTER — PATIENT OUTREACH (OUTPATIENT)
Dept: ADMINISTRATIVE | Facility: HOSPITAL | Age: 59
End: 2024-02-15
Payer: COMMERCIAL

## 2024-03-11 RX ORDER — HYDROCHLOROTHIAZIDE 25 MG/1
50 TABLET ORAL DAILY
Qty: 180 TABLET | Refills: 3 | Status: SHIPPED | OUTPATIENT
Start: 2024-03-11 | End: 2024-06-17 | Stop reason: SDUPTHER

## 2024-03-11 NOTE — TELEPHONE ENCOUNTER
Care Due:                  Date            Visit Type   Department     Provider  --------------------------------------------------------------------------------                                EP -                              PRIMARY      [facility]  Last Visit: 02-      CARE ([facility])   MEDICINE       Taiwo Vazquez  Next Visit: None Scheduled  None         None Found                                                            Last  Test          Frequency    Reason                     Performed    Due Date  --------------------------------------------------------------------------------    Office Visit  15 months..  atorvastatin,              02- 05-                             hydroCHLOROthiazide......    CMP.........  12 months..  atorvastatin,              02- 02-                             hydroCHLOROthiazide......    Lipid Panel.  12 months..  atorvastatin.............  02- 02-    [employer] Embedded Care Due Messages. Reference number: [id number].   3/11/2024 11:44:56 AM CDT

## 2024-03-13 DIAGNOSIS — I10 ESSENTIAL HYPERTENSION: ICD-10-CM

## 2024-03-20 DIAGNOSIS — Z12.31 OTHER SCREENING MAMMOGRAM: ICD-10-CM

## 2024-04-16 NOTE — TELEPHONE ENCOUNTER
No care due was identified.  [facility] Embedded Care Due Messages. Reference number: [id number].   4/16/2024 3:22:50 PM CDT

## 2024-04-17 RX ORDER — ATORVASTATIN CALCIUM 10 MG/1
10 TABLET, FILM COATED ORAL DAILY
Qty: 90 TABLET | Refills: 3 | Status: SHIPPED | OUTPATIENT
Start: 2024-04-17 | End: 2025-04-17

## 2024-05-16 ENCOUNTER — PATIENT OUTREACH (OUTPATIENT)
Dept: ADMINISTRATIVE | Facility: HOSPITAL | Age: 59
End: 2024-05-16
Payer: COMMERCIAL

## 2024-05-16 NOTE — PROGRESS NOTES
VBHM Score: 3     Colon Cancer Screening  Mammogram  Uncontrolled BP           Additional Notes:  Attempted to contact the patient to discuss/schedule overdue HM screenings, no answer, left voicemail. Patient has appointment with PCP on 5/23/24.         Care Management, Digital Medicine, and/or Education Referrals    OPCM Risk Score: 10.2         Next Steps - Referral Actions: no referrals entered

## 2024-06-17 ENCOUNTER — LAB VISIT (OUTPATIENT)
Dept: LAB | Facility: HOSPITAL | Age: 59
End: 2024-06-17
Attending: FAMILY MEDICINE
Payer: COMMERCIAL

## 2024-06-17 ENCOUNTER — OFFICE VISIT (OUTPATIENT)
Dept: INTERNAL MEDICINE | Facility: CLINIC | Age: 59
End: 2024-06-17
Payer: COMMERCIAL

## 2024-06-17 VITALS
BODY MASS INDEX: 24.53 KG/M2 | TEMPERATURE: 99 F | SYSTOLIC BLOOD PRESSURE: 132 MMHG | OXYGEN SATURATION: 100 % | WEIGHT: 121.69 LBS | DIASTOLIC BLOOD PRESSURE: 74 MMHG | HEIGHT: 59 IN | HEART RATE: 76 BPM

## 2024-06-17 DIAGNOSIS — I10 PRIMARY HYPERTENSION: ICD-10-CM

## 2024-06-17 DIAGNOSIS — Z12.11 COLON CANCER SCREENING: ICD-10-CM

## 2024-06-17 DIAGNOSIS — Z00.00 ANNUAL PHYSICAL EXAM: Primary | ICD-10-CM

## 2024-06-17 DIAGNOSIS — E78.5 HYPERLIPIDEMIA, UNSPECIFIED HYPERLIPIDEMIA TYPE: ICD-10-CM

## 2024-06-17 DIAGNOSIS — Z28.39 IMMUNIZATION DEFICIENCY: ICD-10-CM

## 2024-06-17 DIAGNOSIS — Z82.3 FAMILY HISTORY OF CVA: ICD-10-CM

## 2024-06-17 LAB
ALBUMIN SERPL BCP-MCNC: 4.2 G/DL (ref 3.5–5.2)
ALP SERPL-CCNC: 96 U/L (ref 55–135)
ALT SERPL W/O P-5'-P-CCNC: 20 U/L (ref 10–44)
ANION GAP SERPL CALC-SCNC: 12 MMOL/L (ref 8–16)
AST SERPL-CCNC: 25 U/L (ref 10–40)
BASOPHILS # BLD AUTO: 0.07 K/UL (ref 0–0.2)
BASOPHILS NFR BLD: 1 % (ref 0–1.9)
BILIRUB SERPL-MCNC: 0.7 MG/DL (ref 0.1–1)
BUN SERPL-MCNC: 11 MG/DL (ref 6–20)
CALCIUM SERPL-MCNC: 10.2 MG/DL (ref 8.7–10.5)
CHLORIDE SERPL-SCNC: 98 MMOL/L (ref 95–110)
CHOLEST SERPL-MCNC: 246 MG/DL (ref 120–199)
CHOLEST/HDLC SERPL: 3.5 {RATIO} (ref 2–5)
CO2 SERPL-SCNC: 29 MMOL/L (ref 23–29)
CREAT SERPL-MCNC: 0.9 MG/DL (ref 0.5–1.4)
DIFFERENTIAL METHOD BLD: NORMAL
EOSINOPHIL # BLD AUTO: 0.3 K/UL (ref 0–0.5)
EOSINOPHIL NFR BLD: 4.4 % (ref 0–8)
ERYTHROCYTE [DISTWIDTH] IN BLOOD BY AUTOMATED COUNT: 12.7 % (ref 11.5–14.5)
EST. GFR  (NO RACE VARIABLE): >60 ML/MIN/1.73 M^2
GLUCOSE SERPL-MCNC: 108 MG/DL (ref 70–110)
HCT VFR BLD AUTO: 46.4 % (ref 37–48.5)
HDLC SERPL-MCNC: 70 MG/DL (ref 40–75)
HDLC SERPL: 28.5 % (ref 20–50)
HGB BLD-MCNC: 15.5 G/DL (ref 12–16)
IMM GRANULOCYTES # BLD AUTO: 0.03 K/UL (ref 0–0.04)
IMM GRANULOCYTES NFR BLD AUTO: 0.4 % (ref 0–0.5)
LDLC SERPL CALC-MCNC: 143.4 MG/DL (ref 63–159)
LYMPHOCYTES # BLD AUTO: 2.2 K/UL (ref 1–4.8)
LYMPHOCYTES NFR BLD: 31.5 % (ref 18–48)
MCH RBC QN AUTO: 28.9 PG (ref 27–31)
MCHC RBC AUTO-ENTMCNC: 33.4 G/DL (ref 32–36)
MCV RBC AUTO: 87 FL (ref 82–98)
MONOCYTES # BLD AUTO: 0.7 K/UL (ref 0.3–1)
MONOCYTES NFR BLD: 9.9 % (ref 4–15)
NEUTROPHILS # BLD AUTO: 3.6 K/UL (ref 1.8–7.7)
NEUTROPHILS NFR BLD: 52.8 % (ref 38–73)
NONHDLC SERPL-MCNC: 176 MG/DL
NRBC BLD-RTO: 0 /100 WBC
PLATELET # BLD AUTO: 276 K/UL (ref 150–450)
PMV BLD AUTO: 10.7 FL (ref 9.2–12.9)
POTASSIUM SERPL-SCNC: 2.9 MMOL/L (ref 3.5–5.1)
PROT SERPL-MCNC: 8.4 G/DL (ref 6–8.4)
RBC # BLD AUTO: 5.36 M/UL (ref 4–5.4)
SODIUM SERPL-SCNC: 139 MMOL/L (ref 136–145)
TRIGL SERPL-MCNC: 163 MG/DL (ref 30–150)
TSH SERPL DL<=0.005 MIU/L-ACNC: 3.97 UIU/ML (ref 0.4–4)
WBC # BLD AUTO: 6.88 K/UL (ref 3.9–12.7)

## 2024-06-17 PROCEDURE — 84443 ASSAY THYROID STIM HORMONE: CPT | Performed by: FAMILY MEDICINE

## 2024-06-17 PROCEDURE — 80053 COMPREHEN METABOLIC PANEL: CPT | Performed by: FAMILY MEDICINE

## 2024-06-17 PROCEDURE — 99214 OFFICE O/P EST MOD 30 MIN: CPT | Mod: S$GLB,,, | Performed by: FAMILY MEDICINE

## 2024-06-17 PROCEDURE — 3075F SYST BP GE 130 - 139MM HG: CPT | Mod: CPTII,S$GLB,, | Performed by: FAMILY MEDICINE

## 2024-06-17 PROCEDURE — 80061 LIPID PANEL: CPT | Performed by: FAMILY MEDICINE

## 2024-06-17 PROCEDURE — 85025 COMPLETE CBC W/AUTO DIFF WBC: CPT | Performed by: FAMILY MEDICINE

## 2024-06-17 PROCEDURE — 36415 COLL VENOUS BLD VENIPUNCTURE: CPT | Performed by: FAMILY MEDICINE

## 2024-06-17 PROCEDURE — 99999 PR PBB SHADOW E&M-EST. PATIENT-LVL III: CPT | Mod: PBBFAC,,, | Performed by: FAMILY MEDICINE

## 2024-06-17 PROCEDURE — 1159F MED LIST DOCD IN RCRD: CPT | Mod: CPTII,S$GLB,, | Performed by: FAMILY MEDICINE

## 2024-06-17 PROCEDURE — 3078F DIAST BP <80 MM HG: CPT | Mod: CPTII,S$GLB,, | Performed by: FAMILY MEDICINE

## 2024-06-17 PROCEDURE — 3008F BODY MASS INDEX DOCD: CPT | Mod: CPTII,S$GLB,, | Performed by: FAMILY MEDICINE

## 2024-06-17 RX ORDER — HYDROCHLOROTHIAZIDE 25 MG/1
50 TABLET ORAL DAILY
Qty: 180 TABLET | Refills: 3 | Status: SHIPPED | OUTPATIENT
Start: 2024-06-17

## 2024-06-17 NOTE — PROGRESS NOTES
Subjective:       Patient ID: Juan C Dennis is a 59 y.o. female.    Chief Complaint: Annual Exam    Annual exam.  Follow-up hypertension hyperlipidemia.  She has a family history of CVA.  She denies headache chest pain palpitations shortness breath or edema.      Review of Systems   Constitutional:  Negative for activity change, appetite change, fatigue and unexpected weight change.   HENT:  Negative for congestion, hearing loss and trouble swallowing.    Eyes:  Negative for discharge and visual disturbance.   Respiratory:  Negative for cough, chest tightness, shortness of breath and wheezing.    Cardiovascular:  Negative for chest pain, palpitations and leg swelling.   Gastrointestinal:  Negative for abdominal distention, abdominal pain, constipation, diarrhea and vomiting.   Genitourinary:  Negative for difficulty urinating, dysuria, frequency and hematuria.   Neurological:  Negative for dizziness, weakness, light-headedness and headaches.   Psychiatric/Behavioral:  Negative for dysphoric mood.        Objective:      Physical Exam  Constitutional:       General: She is not in acute distress.     Appearance: She is not ill-appearing or diaphoretic.   HENT:      Right Ear: Tympanic membrane normal.      Left Ear: Tympanic membrane normal.      Nose: Nose normal.   Eyes:      Conjunctiva/sclera: Conjunctivae normal.   Neck:      Comments: 2+ carotids normal thyroid  Cardiovascular:      Rate and Rhythm: Normal rate and regular rhythm.      Heart sounds: No murmur heard.     No gallop.   Pulmonary:      Effort: Pulmonary effort is normal. No respiratory distress.      Breath sounds: No wheezing, rhonchi or rales.   Abdominal:      General: There is no distension.   Lymphadenopathy:      Cervical: No cervical adenopathy.   Skin:     General: Skin is warm and dry.   Neurological:      Mental Status: She is alert and oriented to person, place, and time.   Psychiatric:         Mood and Affect: Mood normal.          Behavior: Behavior normal.         Lab Visit on 02/27/2023   Component Date Value Ref Range Status    Sodium 02/27/2023 138  136 - 145 mmol/L Final    Potassium 02/27/2023 3.9  3.5 - 5.1 mmol/L Final    Chloride 02/27/2023 98  95 - 110 mmol/L Final    CO2 02/27/2023 27  23 - 29 mmol/L Final    Glucose 02/27/2023 101  70 - 110 mg/dL Final    BUN 02/27/2023 12  6 - 20 mg/dL Final    Creatinine 02/27/2023 0.9  0.5 - 1.4 mg/dL Final    Calcium 02/27/2023 9.7  8.7 - 10.5 mg/dL Final    Total Protein 02/27/2023 8.5 (H)  6.0 - 8.4 g/dL Final    Albumin 02/27/2023 4.3  3.5 - 5.2 g/dL Final    Total Bilirubin 02/27/2023 0.6  0.1 - 1.0 mg/dL Final    Alkaline Phosphatase 02/27/2023 108  55 - 135 U/L Final    AST 02/27/2023 31  10 - 40 U/L Final    ALT 02/27/2023 21  10 - 44 U/L Final    Anion Gap 02/27/2023 13  8 - 16 mmol/L Final    eGFR 02/27/2023 >60.0  >60 mL/min/1.73 m^2 Final    WBC 02/27/2023 7.47  3.90 - 12.70 K/uL Final    RBC 02/27/2023 4.95  4.00 - 5.40 M/uL Final    Hemoglobin 02/27/2023 14.1  12.0 - 16.0 g/dL Final    Hematocrit 02/27/2023 43.5  37.0 - 48.5 % Final    MCV 02/27/2023 88  82 - 98 fL Final    MCH 02/27/2023 28.5  27.0 - 31.0 pg Final    MCHC 02/27/2023 32.4  32.0 - 36.0 g/dL Final    RDW 02/27/2023 12.6  11.5 - 14.5 % Final    Platelets 02/27/2023 225  150 - 450 K/uL Final    MPV 02/27/2023 10.9  9.2 - 12.9 fL Final    Immature Granulocytes 02/27/2023 0.3  0.0 - 0.5 % Final    Gran # (ANC) 02/27/2023 4.6  1.8 - 7.7 K/uL Final    Immature Grans (Abs) 02/27/2023 0.02  0.00 - 0.04 K/uL Final    Lymph # 02/27/2023 1.9  1.0 - 4.8 K/uL Final    Mono # 02/27/2023 0.6  0.3 - 1.0 K/uL Final    Eos # 02/27/2023 0.3  0.0 - 0.5 K/uL Final    Baso # 02/27/2023 0.07  0.00 - 0.20 K/uL Final    nRBC 02/27/2023 0  0 /100 WBC Final    Gran % 02/27/2023 61.9  38.0 - 73.0 % Final    Lymph % 02/27/2023 25.8  18.0 - 48.0 % Final    Mono % 02/27/2023 7.5  4.0 - 15.0 % Final    Eosinophil % 02/27/2023 3.6  0.0 - 8.0 %  Final    Basophil % 02/27/2023 0.9  0.0 - 1.9 % Final    Differential Method 02/27/2023 Automated   Final    Sodium 02/27/2023 138  136 - 145 mmol/L Final    Potassium 02/27/2023 3.9  3.5 - 5.1 mmol/L Final    Chloride 02/27/2023 98  95 - 110 mmol/L Final    CO2 02/27/2023 27  23 - 29 mmol/L Final    Glucose 02/27/2023 101  70 - 110 mg/dL Final    BUN 02/27/2023 12  6 - 20 mg/dL Final    Creatinine 02/27/2023 0.9  0.5 - 1.4 mg/dL Final    Calcium 02/27/2023 9.7  8.7 - 10.5 mg/dL Final    Total Protein 02/27/2023 8.5 (H)  6.0 - 8.4 g/dL Final    Albumin 02/27/2023 4.3  3.5 - 5.2 g/dL Final    Total Bilirubin 02/27/2023 0.6  0.1 - 1.0 mg/dL Final    Alkaline Phosphatase 02/27/2023 108  55 - 135 U/L Final    AST 02/27/2023 31  10 - 40 U/L Final    ALT 02/27/2023 21  10 - 44 U/L Final    Anion Gap 02/27/2023 13  8 - 16 mmol/L Final    eGFR 02/27/2023 >60.0  >60 mL/min/1.73 m^2 Final    Cholesterol 02/27/2023 253 (H)  120 - 199 mg/dL Final    Triglycerides 02/27/2023 164 (H)  30 - 150 mg/dL Final    HDL 02/27/2023 71  40 - 75 mg/dL Final    LDL Cholesterol 02/27/2023 149.2  63.0 - 159.0 mg/dL Final    HDL/Cholesterol Ratio 02/27/2023 28.1  20.0 - 50.0 % Final    Total Cholesterol/HDL Ratio 02/27/2023 3.6  2.0 - 5.0 Final    Non-HDL Cholesterol 02/27/2023 182  mg/dL Final    TSH 02/27/2023 3.057  0.400 - 4.000 uIU/mL Final     Assessment:       1. Annual physical exam    2. Primary hypertension    3. Colon cancer screening    4. Hyperlipidemia, unspecified hyperlipidemia type    5. Immunization deficiency    6. Family history of CVA        Plan:     Blood pressure controlled lab was ordered medications reviewed and refilled.  Discussed need for colon screening.  She agrees to Cologuard.  Discussed need for immunizations.  She agrees to Shingrix.  Follow-up 1 year.    Annual physical exam    Primary hypertension  -     CBC Auto Differential; Future; Expected date: 06/17/2024  -     Urinalysis; Future; Expected date:  06/17/2024  -     Comprehensive Metabolic Panel; Future; Expected date: 06/17/2024  -     Lipid Panel; Future; Expected date: 06/17/2024  -     TSH; Future; Expected date: 06/17/2024    Colon cancer screening  -     Cologuard Screening (Multitarget Stool DNA); Future; Expected date: 06/17/2024    Hyperlipidemia, unspecified hyperlipidemia type    Immunization deficiency    Family history of CVA    Other orders  -     hydroCHLOROthiazide (HYDRODIURIL) 25 MG tablet; Take 2 tablets (50 mg total) by mouth once daily.  Dispense: 180 tablet; Refill: 3

## 2024-06-18 DIAGNOSIS — E78.5 HYPERLIPIDEMIA, UNSPECIFIED HYPERLIPIDEMIA TYPE: Primary | ICD-10-CM

## 2024-06-18 DIAGNOSIS — E87.6 HYPOKALEMIA: ICD-10-CM

## 2024-06-18 RX ORDER — POTASSIUM CHLORIDE 20 MEQ/1
20 TABLET, EXTENDED RELEASE ORAL DAILY
Qty: 10 TABLET | Refills: 0 | Status: SHIPPED | OUTPATIENT
Start: 2024-06-18

## 2024-07-02 ENCOUNTER — LAB VISIT (OUTPATIENT)
Dept: LAB | Facility: HOSPITAL | Age: 59
End: 2024-07-02
Attending: FAMILY MEDICINE
Payer: COMMERCIAL

## 2024-07-02 DIAGNOSIS — E87.6 HYPOKALEMIA: ICD-10-CM

## 2024-07-02 LAB — POTASSIUM SERPL-SCNC: 3.3 MMOL/L (ref 3.5–5.1)

## 2024-07-02 PROCEDURE — 84132 ASSAY OF SERUM POTASSIUM: CPT | Performed by: FAMILY MEDICINE

## 2024-07-02 PROCEDURE — 36415 COLL VENOUS BLD VENIPUNCTURE: CPT | Performed by: FAMILY MEDICINE

## 2024-07-03 RX ORDER — POTASSIUM CHLORIDE 750 MG/1
10 CAPSULE, EXTENDED RELEASE ORAL DAILY
Qty: 30 CAPSULE | Refills: 0 | Status: SHIPPED | OUTPATIENT
Start: 2024-07-03

## 2024-09-16 PROBLEM — Z00.00 ANNUAL PHYSICAL EXAM: Status: RESOLVED | Noted: 2024-06-17 | Resolved: 2024-09-16

## 2025-05-08 RX ORDER — ATORVASTATIN CALCIUM 10 MG/1
10 TABLET, FILM COATED ORAL DAILY
Qty: 90 TABLET | Refills: 0 | Status: SHIPPED | OUTPATIENT
Start: 2025-05-08 | End: 2025-08-06

## 2025-05-08 NOTE — TELEPHONE ENCOUNTER
Care Due:                  Date            Visit Type   Department     Provider  --------------------------------------------------------------------------------                                MYCHART                              ANNUAL                              CHECKUP/PHY  [facility]  Last Visit: 06-      S            SYLVIA Vazquez  Next Visit: None Scheduled  None         None Found                                                            Last  Test          Frequency    Reason                     Performed    Due Date  --------------------------------------------------------------------------------    CMP.........  12 months..  atorvastatin,              06- 06-                             hydroCHLOROthiazide,                             potassium................    Lipid Panel.  12 months..  atorvastatin.............  06- 06-    [employer] Embedded Care Due Messages. Reference number: [id number].   5/08/2025 10:53:24 AM CDT

## 2025-05-08 NOTE — TELEPHONE ENCOUNTER
Provider Staff:  Action required for this patient    Requires labs - CMP, Lipid panel     Please see care gap opportunities below in Care Due Message.    Thanks!  [facility]     Appointments      Date Provider   Last Visit   6/17/2024 Taiwo Vazquez MD   Next Visit   Visit date not found Taiwo Vazquez MD     Refill Decision Note   Juan C Dennis  is requesting a refill authorization.  Brief Assessment and Rationale for Refill:  Approve     Medication Therapy Plan:        Comments:     Note composed:11:53 AM 05/08/2025

## 2025-06-12 ENCOUNTER — PATIENT OUTREACH (OUTPATIENT)
Dept: ADMINISTRATIVE | Facility: HOSPITAL | Age: 60
End: 2025-06-12
Payer: COMMERCIAL

## 2025-06-25 DIAGNOSIS — I10 PRIMARY HYPERTENSION: ICD-10-CM

## 2025-07-08 DIAGNOSIS — I10 PRIMARY HYPERTENSION: Primary | ICD-10-CM

## 2025-07-08 RX ORDER — HYDROCHLOROTHIAZIDE 25 MG/1
50 TABLET ORAL DAILY
Qty: 60 TABLET | Refills: 0 | Status: SHIPPED | OUTPATIENT
Start: 2025-07-08

## 2025-07-08 NOTE — TELEPHONE ENCOUNTER
Care Due:                  Date            Visit Type   Department     Provider  --------------------------------------------------------------------------------                                MYCHART                              ANNUAL                              CHECKUP/PHY  [facility]  Last Visit: 06-      S            SYLVIA Vazquez  Next Visit: None Scheduled  None         None Found                                                            Last  Test          Frequency    Reason                     Performed    Due Date  --------------------------------------------------------------------------------    Office Visit  15 months..  atorvastatin,              06-   09-                             hydroCHLOROthiazide,                             potassium................    CMP.........  12 months..  atorvastatin,              06- 06-                             hydroCHLOROthiazide,                             potassium................    Lipid Panel.  12 months..  atorvastatin.............  06- 06-    [employer] Embedded Care Due Messages. Reference number: [id number].   7/08/2025 3:15:41 PM CDT

## 2025-07-08 NOTE — TELEPHONE ENCOUNTER
Refill Routing Note   Medication(s) are not appropriate for processing by [facility] for the following reason(s):        Required labs outdated  Required vitals outdated    [facility] action(s):  Defer   Requires appointment : Yes   Requires labs : Yes    Future office visit 7/18/25 with JORDAN Allen         Appointments  past 12m or future 3m with PCP    Date Provider   Last Visit   6/17/2024 Taiwo Vazquez MD   Next Visit   Visit date not found Taiwo Vazquez MD   ED visits in past 90 days: 0        Note composed:3:46 PM 07/08/2025

## 2025-07-18 ENCOUNTER — OFFICE VISIT (OUTPATIENT)
Dept: INTERNAL MEDICINE | Facility: CLINIC | Age: 60
End: 2025-07-18
Payer: COMMERCIAL

## 2025-07-18 ENCOUNTER — LAB VISIT (OUTPATIENT)
Dept: LAB | Facility: HOSPITAL | Age: 60
End: 2025-07-18
Attending: FAMILY MEDICINE
Payer: COMMERCIAL

## 2025-07-18 VITALS
WEIGHT: 117.31 LBS | HEIGHT: 59 IN | SYSTOLIC BLOOD PRESSURE: 138 MMHG | OXYGEN SATURATION: 96 % | DIASTOLIC BLOOD PRESSURE: 88 MMHG | TEMPERATURE: 98 F | HEART RATE: 85 BPM | BODY MASS INDEX: 23.65 KG/M2

## 2025-07-18 DIAGNOSIS — Z12.11 COLON CANCER SCREENING: ICD-10-CM

## 2025-07-18 DIAGNOSIS — Z00.00 ANNUAL PHYSICAL EXAM: ICD-10-CM

## 2025-07-18 DIAGNOSIS — Z00.00 ANNUAL PHYSICAL EXAM: Primary | ICD-10-CM

## 2025-07-18 DIAGNOSIS — I10 PRIMARY HYPERTENSION: ICD-10-CM

## 2025-07-18 DIAGNOSIS — Z12.31 ENCOUNTER FOR SCREENING MAMMOGRAM FOR BREAST CANCER: ICD-10-CM

## 2025-07-18 LAB
ABSOLUTE EOSINOPHIL (OHS): 0.16 K/UL
ABSOLUTE MONOCYTE (OHS): 0.6 K/UL (ref 0.3–1)
ABSOLUTE NEUTROPHIL COUNT (OHS): 3.76 K/UL (ref 1.8–7.7)
ALBUMIN SERPL BCP-MCNC: 4.5 G/DL (ref 3.5–5.2)
ALP SERPL-CCNC: 93 UNIT/L (ref 40–150)
ALT SERPL W/O P-5'-P-CCNC: 17 UNIT/L (ref 10–44)
ANION GAP (OHS): 12 MMOL/L (ref 8–16)
AST SERPL-CCNC: 25 UNIT/L (ref 11–45)
BASOPHILS # BLD AUTO: 0.06 K/UL
BASOPHILS NFR BLD AUTO: 1 %
BILIRUB SERPL-MCNC: 1 MG/DL (ref 0.1–1)
BILIRUB UR QL STRIP.AUTO: NEGATIVE
BUN SERPL-MCNC: 14 MG/DL (ref 6–20)
CALCIUM SERPL-MCNC: 9.7 MG/DL (ref 8.7–10.5)
CHLORIDE SERPL-SCNC: 90 MMOL/L (ref 95–110)
CHOLEST SERPL-MCNC: 225 MG/DL (ref 120–199)
CHOLEST/HDLC SERPL: 2.9 {RATIO} (ref 2–5)
CLARITY UR: CLEAR
CO2 SERPL-SCNC: 28 MMOL/L (ref 23–29)
COLOR UR AUTO: YELLOW
CREAT SERPL-MCNC: 0.8 MG/DL (ref 0.5–1.4)
EAG (OHS): 117 MG/DL (ref 68–131)
ERYTHROCYTE [DISTWIDTH] IN BLOOD BY AUTOMATED COUNT: 12.3 % (ref 11.5–14.5)
GFR SERPLBLD CREATININE-BSD FMLA CKD-EPI: >60 ML/MIN/1.73/M2
GLUCOSE SERPL-MCNC: 105 MG/DL (ref 70–110)
GLUCOSE UR QL STRIP: NEGATIVE
HBA1C MFR BLD: 5.7 % (ref 4–5.6)
HCT VFR BLD AUTO: 44.8 % (ref 37–48.5)
HDLC SERPL-MCNC: 78 MG/DL (ref 40–75)
HDLC SERPL: 34.7 % (ref 20–50)
HGB BLD-MCNC: 15.2 GM/DL (ref 12–16)
HGB UR QL STRIP: NEGATIVE
IMM GRANULOCYTES # BLD AUTO: 0.02 K/UL (ref 0–0.04)
IMM GRANULOCYTES NFR BLD AUTO: 0.3 % (ref 0–0.5)
KETONES UR QL STRIP: ABNORMAL
LDLC SERPL CALC-MCNC: 128.4 MG/DL (ref 63–159)
LEUKOCYTE ESTERASE UR QL STRIP: NEGATIVE
LYMPHOCYTES # BLD AUTO: 1.41 K/UL (ref 1–4.8)
MCH RBC QN AUTO: 28.3 PG (ref 27–31)
MCHC RBC AUTO-ENTMCNC: 33.9 G/DL (ref 32–36)
MCV RBC AUTO: 83 FL (ref 82–98)
NITRITE UR QL STRIP: NEGATIVE
NONHDLC SERPL-MCNC: 147 MG/DL
NUCLEATED RBC (/100WBC) (OHS): 0 /100 WBC
PH UR STRIP: 7 [PH]
PLATELET # BLD AUTO: 321 K/UL (ref 150–450)
PMV BLD AUTO: 9.5 FL (ref 9.2–12.9)
POTASSIUM SERPL-SCNC: 3.2 MMOL/L (ref 3.5–5.1)
PROT SERPL-MCNC: 8.3 GM/DL (ref 6–8.4)
PROT UR QL STRIP: ABNORMAL
RBC # BLD AUTO: 5.37 M/UL (ref 4–5.4)
RELATIVE EOSINOPHIL (OHS): 2.7 %
RELATIVE LYMPHOCYTE (OHS): 23.5 % (ref 18–48)
RELATIVE MONOCYTE (OHS): 10 % (ref 4–15)
RELATIVE NEUTROPHIL (OHS): 62.5 % (ref 38–73)
SODIUM SERPL-SCNC: 130 MMOL/L (ref 136–145)
SP GR UR STRIP: 1.01
TRIGL SERPL-MCNC: 93 MG/DL (ref 30–150)
WBC # BLD AUTO: 6.01 K/UL (ref 3.9–12.7)

## 2025-07-18 PROCEDURE — 82565 ASSAY OF CREATININE: CPT

## 2025-07-18 PROCEDURE — 85025 COMPLETE CBC W/AUTO DIFF WBC: CPT

## 2025-07-18 PROCEDURE — 36415 COLL VENOUS BLD VENIPUNCTURE: CPT

## 2025-07-18 PROCEDURE — 99999 PR PBB SHADOW E&M-EST. PATIENT-LVL IV: CPT | Mod: PBBFAC,,,

## 2025-07-18 PROCEDURE — 83036 HEMOGLOBIN GLYCOSYLATED A1C: CPT

## 2025-07-18 PROCEDURE — 82465 ASSAY BLD/SERUM CHOLESTEROL: CPT

## 2025-07-18 PROCEDURE — 81003 URINALYSIS AUTO W/O SCOPE: CPT

## 2025-07-18 RX ORDER — ATORVASTATIN CALCIUM 10 MG/1
10 TABLET, FILM COATED ORAL DAILY
Qty: 90 TABLET | Refills: 3 | Status: SHIPPED | OUTPATIENT
Start: 2025-07-18

## 2025-07-18 RX ORDER — HYDROCHLOROTHIAZIDE 25 MG/1
50 TABLET ORAL DAILY
Qty: 180 TABLET | Refills: 2 | Status: SHIPPED | OUTPATIENT
Start: 2025-07-18

## 2025-07-18 NOTE — PROGRESS NOTES
Juan C Dennis  07/18/2025  [id number]    Taiwo Vazquez MD  Patient Care Team:  Taiwo Vazquez MD as PCP - General (Family Medicine)          Visit Type:a scheduled routine follow-up visit    Chief Complaint:  Chief Complaint   Patient presents with    Annual Exam          History of Present Illness:    History of Present Illness    CHIEF COMPLAINT:  Ms. Dennis presents today for follow up    BLOOD PRESSURE:  She reports slightly elevated home blood pressure readings, with most recent reading of 150 and previous readings between 130-135. She typically maintains blood pressure less than 140/90. She experiences lightheadedness when blood pressure drops to 125 but remains asymptomatic with elevated readings. She takes her blood pressure medication at 9pm nightly without significant side effects.    SLEEP:  She reports ongoing sleep difficulties, consistently staying awake until midnight. She has never tried melatonin and expresses interest in OTC sleep aids including melatonin and Unisom.    CURRENT MEDICATIONS:  She takes hydrochlorothiazide 50mg which causes nighttime urination but helps with fluid and swelling, atorvastatin 10mg, and multivitamins. She reports medication compliance.    PREVENTIVE CARE:  She received a FIT kit for colon cancer screening approximately one year ago but has not completed or returned the kit.      ROS:  General: -fever, -chills, -fatigue, -weight gain, -weight loss  Eyes: -vision changes, -redness, -discharge  ENT: -ear pain, -nasal congestion, -sore throat  Cardiovascular: -chest pain, -palpitations, -lower extremity edema, +lightheadedness, +orthostatic symptoms  Respiratory: -cough, -shortness of breath  Gastrointestinal: -abdominal pain, -nausea, -vomiting, -diarrhea, -constipation, -blood in stool  Genitourinary: -dysuria, -hematuria, -frequency, +excessive urination  Musculoskeletal: -joint pain, -muscle pain  Skin: -rash, -lesion  Neurological: -headache, -dizziness,  -numbness, -tingling, +sleep disturbances, +difficulty staying asleep, +difficulty falling asleep  Psychiatric: -anxiety, -depression, +sleep difficulty            History:  History reviewed. No pertinent past medical history.  Past Surgical History:   Procedure Laterality Date     SECTION       Family History   Problem Relation Name Age of Onset    No Known Problems Mother      Hypertension Father      No Known Problems Daughter       Social History[1]  Problem List[2]  Review of patient's allergies indicates:  No Known Allergies    The following were reviewed at this visit: active problem list, medication list, allergies, family history, social history, and health maintenance.    Medications:  Medications Ordered Prior to Encounter[3]    Medications have been reviewed and reconciled with patient at this visit.  Barriers to medications reviewed with patient.    Adverse reactions to current medications reviewed with patient..    Over the counter medications reviewed and reconciled with patient.    Exam:  Wt Readings from Last 3 Encounters:   25 53.2 kg (117 lb 4.6 oz)   24 55.2 kg (121 lb 11.1 oz)   23 55.2 kg (121 lb 11.5 oz)     Temp Readings from Last 3 Encounters:   25 97.9 °F (36.6 °C) (Tympanic)   24 98.6 °F (37 °C) (Temporal)   23 97.8 °F (36.6 °C) (Temporal)     BP Readings from Last 3 Encounters:   25 138/88   24 132/74   23 128/70     Pulse Readings from Last 3 Encounters:   25 85   24 76   23 92     Body mass index is 23.69 kg/m².    Physical Exam  Nursing note reviewed.   HENT:      Head: Normocephalic and atraumatic.   Cardiovascular:      Rate and Rhythm: Regular rhythm.      Heart sounds: Normal heart sounds.   Pulmonary:      Effort: Pulmonary effort is normal. No respiratory distress.      Breath sounds: Normal breath sounds.   Neurological:      Mental Status: She is alert and oriented to person, place, and time.  "  Psychiatric:         Mood and Affect: Mood normal.         Behavior: Behavior normal.         Thought Content: Thought content normal.         Judgment: Judgment normal.           Laboratory Reviewed ({Yes)  Lab Results   Component Value Date    WBC 6.88 06/17/2024    HGB 15.5 06/17/2024    HCT 46.4 06/17/2024     06/17/2024    CHOL 246 (H) 06/17/2024    TRIG 163 (H) 06/17/2024    HDL 70 06/17/2024    ALT 20 06/17/2024    AST 25 06/17/2024     06/17/2024    K 3.3 (L) 07/02/2024    CL 98 06/17/2024    CREATININE 0.9 06/17/2024    BUN 11 06/17/2024    CO2 29 06/17/2024    TSH 3.973 06/17/2024       Juan C Crespo" was seen today for annual exam.    Diagnoses and all orders for this visit:    Annual physical exam  -     CBC Auto Differential; Future  -     Lipid Panel; Future  -     Comprehensive Metabolic Panel; Future  -     Hemoglobin A1C; Future  -     Urinalysis, Reflex to Urine Culture Urine, Clean Catch; Future    Encounter for screening mammogram for breast cancer  -     Mammo Digital Screening Bilat; Future    Colon cancer screening  -     Cologuard Screening (Multitarget Stool DNA); Future  -     Cologuard Screening (Multitarget Stool DNA)    Primary hypertension  -     hydroCHLOROthiazide (HYDRODIURIL) 25 MG tablet; Take 2 tablets (50 mg total) by mouth once daily.    Other orders  -     atorvastatin (LIPITOR) 10 MG tablet; Take 1 tablet (10 mg total) by mouth once daily.          Assessment & Plan    HYPERTENSION:  - Monitored blood pressure, which improved from initial reading of 150 to 144/90 during today's visit, and has been less than 140/90 at other times.  - Evaluated current antihypertensive regimen and discussed the differences between hydrochlorothiazide and losartan in blood pressure management.  - Educated patient about home monitoring techniques.  - Continued hydrochlorothiazide 50 mg daily for its effectiveness, including fluid-reducing effects.    ORTHOSTATIC " HYPOTENSION:  - Ms. Dennis reports feeling lightheaded when blood pressure drops to approximately 125.    HYPERLIPIDEMIA:  - Ordered cholesterol level check during today's visit.  - Continued atorvastatin 10 mg daily for cholesterol management.    INSOMNIA:  - Ms. Dennis reports trouble sleeping at midnight.  - Discussed management options and recommended trying OTC remedies first: melatonin initially, then diphenhydramine (Unisom) if needed, before considering prescription sleep aids.    GENERAL HEALTH MANAGEMENT:  - Reviewed need for routine health screenings including mammogram and colon cancer screening.  - Ordered CMP (Comprehensive Metabolic Panel) and CBC (Complete Blood Count).  - Explained the components of CMP test, including renal function, liver function, electrolytes, and potassium levels.  - Discussed medication management for upcoming travel plans; advised to contact office when planning extended international travel to arrange for adequate medication supply or 90-day supply before departure.    FOLLOW-UP:  - Follow up in a few months with Dr. Vazquez            Care Plan/Goals: Reviewed    Goals    None         Follow up: No follow-ups on file.    After visit summary was printed and given to patient upon discharge today.  Patient goals and care plan are included in After Visit Summary.    This note was generated with the assistance of ambient listening technology. Verbal consent was obtained by the patient and accompanying visitor(s) for the recording of patient appointment to facilitate this note. I attest to having reviewed and edited the generated note for accuracy, though some syntax or spelling errors may persist. Please contact the author of this note for any clarification.            [1]   Social History  Socioeconomic History    Marital status:    Tobacco Use    Smoking status: Never    Smokeless tobacco: Never     Social Drivers of Health     Financial Resource Strain: Patient Declined  (6/16/2024)    Overall Financial Resource Strain (CARDIA)     Difficulty of Paying Living Expenses: Patient declined   Food Insecurity: Patient Declined (6/16/2024)    Hunger Vital Sign     Worried About Running Out of Food in the Last Year: Patient declined     Ran Out of Food in the Last Year: Patient declined   Transportation Needs: Unknown (7/17/2025)    PRAPARE - Transportation     Lack of Transportation (Non-Medical): No   Physical Activity: Unknown (7/17/2025)    Exercise Vital Sign     Minutes of Exercise per Session: 10 min   Stress: Patient Declined (6/16/2024)    [employer] - Occupational Stress Questionnaire     Feeling of Stress : Patient declined   Housing Stability: Low Risk  (7/17/2025)    Housing Stability Vital Sign     Unable to Pay for Housing in the Last Year: No     Homeless in the Last Year: No   [2]   Patient Active Problem List  Diagnosis    Family history of CVA    Family history of essential hypertension    Hyperlipidemia    Proteinuria    Elevated serum protein level    Immunization deficiency    Essential hypertension    Hypokalemia    Primary hypertension    Colon cancer screening   [3]   Current Outpatient Medications on File Prior to Visit   Medication Sig Dispense Refill    [DISCONTINUED] atorvastatin (LIPITOR) 10 MG tablet Take 1 tablet (10 mg total) by mouth once daily. 90 tablet 0    [DISCONTINUED] hydroCHLOROthiazide (HYDRODIURIL) 25 MG tablet Take 2 tablets (50 mg total) by mouth once daily. 60 tablet 0    [DISCONTINUED] potassium chloride (MICRO-K) 10 MEQ CpSR Take 1 capsule (10 mEq total) by mouth once daily. (Patient not taking: Reported on 7/18/2025) 30 capsule 0     No current facility-administered medications on file prior to visit.

## 2025-07-19 LAB — HOLD SPECIMEN: NORMAL

## 2025-07-29 DIAGNOSIS — E87.6 HYPOKALEMIA: Primary | ICD-10-CM

## 2025-07-29 RX ORDER — POTASSIUM CHLORIDE 750 MG/1
10 TABLET, EXTENDED RELEASE ORAL DAILY
Qty: 30 TABLET | Refills: 1 | Status: SHIPPED | OUTPATIENT
Start: 2025-07-29

## 2025-08-28 ENCOUNTER — PATIENT OUTREACH (OUTPATIENT)
Dept: ADMINISTRATIVE | Facility: HOSPITAL | Age: 60
End: 2025-08-28
Payer: COMMERCIAL